# Patient Record
Sex: FEMALE | Race: OTHER | Employment: UNEMPLOYED | URBAN - METROPOLITAN AREA
[De-identification: names, ages, dates, MRNs, and addresses within clinical notes are randomized per-mention and may not be internally consistent; named-entity substitution may affect disease eponyms.]

---

## 2024-06-25 NOTE — TELEPHONE ENCOUNTER
Interpretor ID- [id number]    LMP- 4/4/24  First Pregnancy  No Hx miscarriage or ectopic pregnancy   Menstrual Cycle Regular    Patient has not had an ultrasound or any other prenatal care.     Scheduled for nurse education visit.

## 2024-06-28 NOTE — PROGRESS NOTES
Pt called today for RN OB Education.   LMP: 2024      Pre  BMI: 23.61    EPDS score: 0/30    Working MELYSSA:2025  Hx of genetic abnormality in family: Partner brother with Down Syndrome  Hx of varicella: Vaccinated   Flu Vaccine: Vaccinated   Covid Vaccine: Vaccinated      Consent (if needed): n/a     Sterilization/Contraception: Interested    OUD Screening: Pt. Has answered NO 5P questions and has NO  risk factors.    Pt. Given What pregnant women need to know handout.       SDOH Screening: Low risk     Educational material reviewed with patient: Prenatal care, nutrition, weight gain recommendations, travel, exercise, intercourse, pregnancy changes, safe medications, pregnancy and work, fetal movement, labor and  labor, warning signs, food safety, tdap, cord blood, breastfeeding- Breastfeeding, circumcision, and Group B strep.     Blood transfusion if needed: Consents     PN labs: Placed     ASA Therapy (2 tablets,inform pt to start at 12 wks not before): Discussed  Iron Supplementation (325 mg every other day): Rx sent  Vitamin D (2,000 IUs daily): Rx sent       Optional genetic screening labs were reviewed: Cell FreeDNA, FTS with US, Quad screen MSAFP and CF screening.   Interested- Tosha w/ gender placed up front.   [employer] Media Policy: Discussed       (Before scheduling, ask the following: location preference and provider language preference)    NOB apt:   JF      Disclaimer: The calendars that will be provided at your appointment, are a practice guideline and can change based on the individual.

## 2024-07-05 PROBLEM — O09.519 SUPERVISION OF HIGH-RISK PREGNANCY OF ELDERLY PRIMIGRAVIDA (HCC): Status: ACTIVE | Noted: 2024-07-05

## 2024-07-05 NOTE — PROGRESS NOTES
Shirley Redmond is a 34 year old female  Patient's last menstrual period was 2024.   Chief Complaint   Patient presents with    Prenatal Care     C/o reflux.  OBSTETRICS HISTORY:     OB History    Para Term  AB Living   1             SAB IAB Ectopic Multiple Live Births                  # Outcome Date GA Lbr Pierce/2nd Weight Sex Type Anes PTL Lv   1 Current                GYNE HISTORY:         Menarche: 11 (2024  9:06 AM)  Period Cycle (Days): 28-30 (2024  9:06 AM)  Period Duration (Days): 5 (2024  9:06 AM)  Period Flow: Light (2024  9:06 AM)  Use of Birth Control (if yes, specify type): OCP (2024  9:06 AM)  Date When Birth Control Last Used: 2023 (2024  9:06 AM)  Hx Prior Abnormal Pap: No (2024  9:06 AM)  Pap Date:  () (2024  9:06 AM)         No data to display                  MEDICAL HISTORY:     History reviewed. No pertinent past medical history.    SURGICAL HISTORY:     History reviewed. No pertinent surgical history.    SOCIAL HISTORY:     Social History     Socioeconomic History    Marital status: Unknown   Tobacco Use    Smoking status: Never    Smokeless tobacco: Never   Substance and Sexual Activity    Drug use: Never     Social Determinants of Health     Financial Resource Strain: Low Risk  (2024)    Financial Resource Strain     Difficulty of Paying Living Expenses: Not hard at all     Med Affordability: No   Food Insecurity: No Food Insecurity (2024)    Food Insecurity     Food Insecurity: Never true   Transportation Needs: No Transportation Needs (2024)    Transportation Needs     Lack of Transportation: No   Stress: No Stress Concern Present (2024)    Stress     Feeling of Stress : No   Housing Stability: Low Risk  (2024)    Housing Stability     Housing Instability: No        FAMILY HISTORY:     Family History   Problem Relation Age of Onset    Other (lupus) Mother     Other (hypothyroidism) Mother     No  Known Problems Maternal Grandmother     No Known Problems Maternal Grandfather     No Known Problems Paternal Grandmother     No Known Problems Paternal Grandfather        MEDICATIONS:       Current Outpatient Medications:     Aspirin 81 MG Oral Cap, Take 2 tablets by mouth daily., Disp: 90 capsule, Rfl: 2    Calcium 500 MG Oral Tab, Take 1,000 mg by mouth daily., Disp: 90 tablet, Rfl: 2    famotidine 20 MG Oral Tab, Take 2 tablets (40 mg total) by mouth at bedtime., Disp: 60 tablet, Rfl: 5    prenatal vitamin with DHA 27-0.8-228 MG Oral Cap, Take 1 capsule by mouth daily., Disp: , Rfl:     Ferrous Sulfate 325 (65 Fe) MG Oral Tab, Take 1 tablet (325 mg total) by mouth every other day., Disp: 90 tablet, Rfl: 1    Cholecalciferol (VITAMIN D) 50 MCG (2000 UT) Oral Tab, Take 1 tablet by mouth daily., Disp: 90 tablet, Rfl: 1    ALLERGIES:     No Known Allergies      REVIEW OF SYSTEMS:     Constitutional:    denies fever / chills  Eyes:     denies blurred or double vision  Cardiovascular:  denies chest pain or palpitations  Respiratory:    denies shortness of breath  Gastrointestinal:  denies severe abdominal pain, frequent diarrhea or constipation, nausea / vomiting  Genitourinary:    denies dysuria, bothersome incontinence  Skin/Breast:   denies any breast pain, lumps, or discharge  Neurological:    denies frequent severe headaches  Psychiatric:   denies depression or anxiety, thoughts of harming self or others  Heme/Lymph:    denies easy bruising or bleeding      PHYSICAL EXAM:   Blood pressure 118/74, weight 152 lb 3.2 oz (69 kg), last menstrual period 04/04/2024.  Constitutional:  well developed, well nourished  Head/Face:  normocephalic  Neck/Thyroid: thyroid symmetric, no thyromegaly, no nodules, no adenopathy  Lymphatic: no abnormal supraclavicular or axillary adenopathy is noted  Respiratory:      chest wall symmetric and nontender on palpation, clear to asculation bilateral, no wheezing, rales, ronchi, and  resonance normal upon percussion  Cardiovascular: chest normal in appearance, regular rate and rhythm, no murmurs, PMI palpated midclavicular line  Breast:   normal bilaterally without palpable masses, tenderness, asymmetry, nipple discharge, nipple retraction or skin changes bilaterally  Abdomen:   soft, nontender, nondistended, no masses  Skin/Hair:  no unusual rashes or bruises  Extremities:  no edema, no cyanosis, non tender bilaterally  Psychiatric:   oriented to time, place, person and situation. Appropriate mood and affect    Pelvic Exam:  External Genitalia:  normal appearance, hair distribution, and no lesions  Urethral Meatus:   normal in size, location, without lesions   Bladder:    no fullness, masses or tenderness  Vagina:    normal appearance without lesions, no abnormal discharge  Cervix:    No lesions, normal friability   Uterus:    normal in size, 8 wk sized, normal contour, position, mobility, without  motion tenderness  Adnexa:   normal without masses or tenderness  Perineum:   normal  Anus: no hemorroids     Bs ultrasound live iup    ASSESSMENT & PLAN:     [place] was seen today for prenatal care.    Diagnoses and all orders for this visit:  Added asa 81 mg 2 tabs daily for ama, primip  Ultrasound dating  Labs w/ free dna, sma, cg  caLcium 1g daily    Supervision of high-risk pregnancy of elderly primigravida (HCC)  -     Aspirin 81 MG Oral Cap; Take 2 tablets by mouth daily.  -     Calcium 500 MG Oral Tab; Take 1,000 mg by mouth daily.  -     famotidine 20 MG Oral Tab; Take 2 tablets (40 mg total) by mouth at bedtime.  -     US PREG 1ST TRIM W/EV (CPT=76801/47385); Future  -     ThinPrep Pap with HPV Reflex, Chlamydia/GC; Future  -     Chlamydia/Gc Amplification; Future  -     Cystic Fibrosis (CF), 97 Variants; Future  -     Spinal Muscular Atrophy (SMA); Future  -     [id number] PLUS+SCA; Future    History and physical exam have been performed.  If you ever need to reach a provider please call  the office phone number.  After office hours there is always somebody on call.  Reasons to call the provider discussed with patient.  Prenatal vitamins have been prescribed. The prenatal vitamin has iron and folic acid which helps to prevent iron deficiency anemia and neural tube defects.  Additional iron has been prescribed and should be taken every other day.  Vitamin D supplementation 4300-4434 Iunits daily can be given for pregnant patients whose children are deemed at high risk of asthma. (Patient or her  has asthma).  Vitamin B6 can be prescribed if there is nausea or vomiting and is safe to use up to 3 times daily.  Antacids for reflux are safe.  Tylenol Cold daytime or Tylenol flu daytime are safe to use if there are upper respiratory symptoms.  Extra strength Tylenol can be used for persistent headaches and back pain but in a limited fashion.  Avoidance of nonsteroidals discussed with the patient.  A healthy diet is important and dietary counseling done with the patient..  I counseled that fruits, vegetables, legumes, fish, lean meats, chicken, turkey, nuts and seeds are advised.  Fish to avoid are [place] Mackerel, [place], Orange roughy, Shark, Swordfish, Tilefish, Bigeye tuna. Canned light tuna (including skipjack) is a good choice.  Please avoid fried and greasy foods.  Please avoid caffeine, alcohol, THC.  I recommend calcium supplementation 500 mg daily and Vitamin D 1,000 mg daily that the patient can obtain over the counter.  Exercise is encouraged and is okay for 30 minutes daily 5 to 7 days/week.  Moderate intensity exercise (able to carry on a normal conversation during exercise) is advised.  Strength training is allowed in a safe manner as to not cause back injury.  Sexual intercourse is allowed if there is no vaginal bleeding . Hot tubs and saunas should be avoided during the first trimester.  Swimming is okay to participate.  The patient should be up-to-date regarding COVID-19 vaccination  and the influenza vaccine is recommended during influenza season.   Pregnancy risk factors were reviewed with the patient and prenatal visit frequency and potential timing of future ultrasounds and fetal monitoring if needed were discussed with the patient.  I reviewed the above with the patient and spent approx 32 minutes face to face consultation.         FOLLOW-UP     Return in about 4 weeks (around 8/2/2024) for prenatal visit.      Naresh Crawford MD  7/5/2024

## 2024-08-02 ENCOUNTER — HOSPITAL ENCOUNTER (OUTPATIENT)
Dept: ULTRASOUND IMAGING | Facility: HOSPITAL | Age: 34
Discharge: HOME | End: 2024-08-02
Attending: OBSTETRICS & GYNECOLOGY
Payer: MEDICAID

## 2024-08-02 DIAGNOSIS — O09.519 SUPERVISION OF HIGH-RISK PREGNANCY OF ELDERLY PRIMIGRAVIDA (HCC): ICD-10-CM

## 2024-08-02 PROCEDURE — 76815 OB US LIMITED FETUS(S): CPT | Performed by: OBSTETRICS & GYNECOLOGY

## 2024-08-02 RX ORDER — CHOLECALCIFEROL (VITAMIN D3) 25 MCG
1 TABLET,CHEWABLE ORAL DAILY
Qty: 30 CAPSULE | Refills: 0 | Status: SHIPPED | OUTPATIENT
Start: 2024-08-02

## 2024-08-16 RX ORDER — FERROUS SULFATE 325(65) MG
325 TABLET ORAL EVERY OTHER DAY
Qty: 90 TABLET | Refills: 1 | OUTPATIENT
Start: 2024-08-16

## 2024-08-26 ENCOUNTER — ROUTINE PRENATAL (OUTPATIENT)
Dept: OBGYN CLINIC | Facility: CLINIC | Age: 34
End: 2024-08-26
Payer: MEDICAID

## 2024-08-26 ENCOUNTER — LAB ENCOUNTER (OUTPATIENT)
Dept: LAB | Facility: HOSPITAL | Age: 34
End: 2024-08-26
Attending: OBSTETRICS & GYNECOLOGY
Payer: MEDICAID

## 2024-08-26 VITALS
SYSTOLIC BLOOD PRESSURE: 108 MMHG | DIASTOLIC BLOOD PRESSURE: 68 MMHG | WEIGHT: 159 LBS | BODY MASS INDEX: 24 KG/M2 | HEART RATE: 73 BPM

## 2024-08-26 DIAGNOSIS — O09.519 SUPERVISION OF HIGH-RISK PREGNANCY OF ELDERLY PRIMIGRAVIDA (HCC): ICD-10-CM

## 2024-08-26 DIAGNOSIS — O09.519 SUPERVISION OF HIGH-RISK PREGNANCY OF ELDERLY PRIMIGRAVIDA (HCC): Primary | ICD-10-CM

## 2024-08-26 PROCEDURE — 36415 COLL VENOUS BLD VENIPUNCTURE: CPT

## 2024-08-26 PROCEDURE — 82105 ALPHA-FETOPROTEIN SERUM: CPT

## 2024-08-26 PROCEDURE — 87086 URINE CULTURE/COLONY COUNT: CPT

## 2024-08-26 NOTE — PROGRESS NOTES
Shirley Blancas is a 34 year old female  Patient's last menstrual period was 2024 (exact date).   Chief Complaint   Patient presents with    Prenatal Care       OBSTETRICS HISTORY:     OB History    Para Term  AB Living   1             SAB IAB Ectopic Multiple Live Births                  # Outcome Date GA Lbr Pierce/2nd Weight Sex Type Anes PTL Lv   1 Current                GYNE HISTORY:         Menarche: 12 (2024  9:06 AM)  Period Cycle (Days): 28-30 (2024  9:06 AM)  Period Duration (Days): 5 (2024  9:06 AM)  Period Flow: Light (2024  9:06 AM)  Use of Birth Control (if yes, specify type): OCP (2024  9:06 AM)  Date When Birth Control Last Used: 2023 (2024  9:06 AM)  Hx Prior Abnormal Pap: No (2024  9:06 AM)  Pap Date:  () (2024  9:06 AM)        Latest Ref Rng & Units 2024    10:39 AM   RECENT PAP RESULTS   Thinprep Pap Negative for intraepithelial lesion or malignancy Negative for intraepithelial lesion or malignancy    HPV Negative Negative          MEDICAL HISTORY:     History reviewed. No pertinent past medical history.    SURGICAL HISTORY:     History reviewed. No pertinent surgical history.    SOCIAL HISTORY:     Social History     Socioeconomic History    Marital status: Single   Tobacco Use    Smoking status: Never    Smokeless tobacco: Never   Substance and Sexual Activity    Drug use: Never     Social Determinants of Health     Financial Resource Strain: Low Risk  (2024)    Financial Resource Strain     Difficulty of Paying Living Expenses: Not hard at all     Med Affordability: No   Food Insecurity: No Food Insecurity (2024)    Food Insecurity     Food Insecurity: Never true   Transportation Needs: No Transportation Needs (2024)    Transportation Needs     Lack of Transportation: No   Stress: No Stress Concern Present (2024)    Stress     Feeling of Stress : No   Housing Stability: Low Risk   (6/28/2024)    Housing Stability     Housing Instability: No        FAMILY HISTORY:     Family History   Problem Relation Age of Onset    Other (lupus) Mother     Other (hypothyroidism) Mother     No Known Problems Maternal Grandmother     No Known Problems Maternal Grandfather     No Known Problems Paternal Grandmother     No Known Problems Paternal Grandfather        MEDICATIONS:       Current Outpatient Medications:     prenatal vitamin with DHA 27-0.8-228 MG Oral Cap, Take 1 capsule by mouth daily., Disp: 30 capsule, Rfl: 0    Aspirin 81 MG Oral Cap, Take 2 tablets by mouth daily., Disp: 90 capsule, Rfl: 2    Calcium 500 MG Oral Tab, Take 1,000 mg by mouth daily., Disp: 90 tablet, Rfl: 2    famotidine 20 MG Oral Tab, Take 2 tablets (40 mg total) by mouth at bedtime., Disp: 60 tablet, Rfl: 5    Ferrous Sulfate 325 (65 Fe) MG Oral Tab, Take 1 tablet (325 mg total) by mouth every other day., Disp: 90 tablet, Rfl: 1    Cholecalciferol (VITAMIN D) 50 MCG (2000 UT) Oral Tab, Take 1 tablet by mouth daily., Disp: 90 tablet, Rfl: 1    ALLERGIES:     No Known Allergies      REVIEW OF SYSTEMS:     Constitutional:    denies fever / chills  Cardiovascular:  denies chest pain or palpitations  Respiratory:    denies shortness of breath  Gastrointestinal:  denies severe abdominal pain, frequent diarrhea or constipation, nausea / vomiting  Genitourinary:    denies dysuria, bothersome incontinence  Skin/Breast:   denies any breast pain, lumps, or discharge  Neurological:    denies frequent severe headaches  Psychiatric:   denies depression or anxiety, thoughts of harming self or others      PHYSICAL EXAM:   Blood pressure 108/68, pulse 73, weight 159 lb (72.1 kg), last menstrual period 04/04/2024.  Constitutional:  well developed, well nourished, no distress  Abdomen:   soft, gravid, nontender  Musculoskeletal: no cva tenderness bilaterally  Skin/Hair:  no unusual rashes or bruises  Extremities:  no edema, no cyanosis, non tender  bilaterally  Psychiatric:   oriented to time, place, person and situation. Appropriate mood and affect      ASSESSMENT & PLAN:     [place] was seen today for prenatal care.    Diagnoses and all orders for this visit:    Supervision of high-risk pregnancy of elderly primigravida (HCC)  -     Urine Culture, Routine; Future  -     AFP, Maternal Serum; Future  -     MATERNAL FETAL MEDICINE - INTERNAL      Prenatal checkless second trimester counseling has been completed.  I spent 32 minutes face-to-face with the patient, over half of the time in discussion and counseling of the below factors:  -Abnormal lab values.  I discussed all the current lab values that have returned.  I spent time discussing both normal and any abnormal lab values.  -Selecting a  care provider.  The patient was asked if she has a specific pediatrician that she would like for me to assign.  There is an [place] group that can be assigned if the patient does not have a specific designated provider.  -Prenatal classes.  I discussed with the patient that she can sign up for prenatal classes at [email or web address].  -Signs and symptoms of  labor.  We discussed symptoms such as vaginal bleeding and leakage of fluid vaginally.  These are symptoms that should be brought to the attention of one of the providers.  We discussed round ligament pain and how to help differentiate from  labor pain.  We discussed symptoms and treatment of lower back pain and how to prevent back injury in pregnancy.      FOLLOW-UP     No follow-ups on file.      Naresh Crawford MD  2024

## 2024-08-28 LAB
AFP MOM: 1.37
AFP VALUE: 79.2 NG/ML
GA ON COLL DATE: 20.6 WEEKS
INSULIN DEP AFP: NO
MAT AGE AT EDD: 35.9 YR
MULTIPLE GEST AFP: NO
OSBR RISK 1 IN AFP: 3920
WEIGHT AFP: 159 LBS

## 2024-09-03 RX ORDER — PRENATAL VIT/IRON FUM/FOLIC AC 27MG-0.8MG
1 TABLET ORAL DAILY
Qty: 30 TABLET | Refills: 0 | Status: SHIPPED | OUTPATIENT
Start: 2024-09-03

## 2024-09-09 ENCOUNTER — TELEPHONE (OUTPATIENT)
Dept: PERINATAL CARE | Facility: HOSPITAL | Age: 34
End: 2024-09-09

## 2024-09-09 NOTE — TELEPHONE ENCOUNTER
Using language line  ID [id number] patient's coverage was verified as [employer].  Patient informed [facility] is not contracted with them.  Patient verified that [employer] coverage will begin 10/1/2024.  Patient informed to contact OB office for alternative testing or to check if Level 2 can wait until October.  Patient agreed.

## 2024-09-24 ENCOUNTER — ROUTINE PRENATAL (OUTPATIENT)
Dept: OBGYN CLINIC | Facility: CLINIC | Age: 34
End: 2024-09-24

## 2024-09-24 VITALS
SYSTOLIC BLOOD PRESSURE: 113 MMHG | BODY MASS INDEX: 25 KG/M2 | HEART RATE: 72 BPM | WEIGHT: 162 LBS | DIASTOLIC BLOOD PRESSURE: 75 MMHG

## 2024-09-24 DIAGNOSIS — O09.519 SUPERVISION OF HIGH-RISK PREGNANCY OF ELDERLY PRIMIGRAVIDA (HCC): Primary | ICD-10-CM

## 2024-09-24 PROCEDURE — 99212 OFFICE O/P EST SF 10 MIN: CPT | Performed by: OBSTETRICS & GYNECOLOGY

## 2024-09-24 NOTE — PROGRESS NOTES
Virginia Merry Ruy Blancas is a 34 year old female  Patient's last menstrual period was 2024 (exact date).   Chief Complaint   Patient presents with    Prenatal Care     Pt here for prenatal visit       OBSTETRICS HISTORY:     OB History    Para Term  AB Living   1             SAB IAB Ectopic Multiple Live Births                  # Outcome Date GA Lbr Pierce/2nd Weight Sex Type Anes PTL Lv   1 Current                GYNE HISTORY:         Menarche: 12 (2024  9:06 AM)  Period Cycle (Days): 28-30 (2024  9:06 AM)  Period Duration (Days): 5 (2024  9:06 AM)  Period Flow: Light (2024  9:06 AM)  Use of Birth Control (if yes, specify type): OCP (2024  9:06 AM)  Date When Birth Control Last Used: 2023 (2024  9:06 AM)  Hx Prior Abnormal Pap: No (2024  9:06 AM)  Pap Date:  () (2024  9:06 AM)        Latest Ref Rng & Units 2024    10:39 AM   RECENT PAP RESULTS   Thinprep Pap Negative for intraepithelial lesion or malignancy Negative for intraepithelial lesion or malignancy    HPV Negative Negative          MEDICAL HISTORY:     History reviewed. No pertinent past medical history.    SURGICAL HISTORY:     History reviewed. No pertinent surgical history.    SOCIAL HISTORY:     Social History     Socioeconomic History    Marital status: Single   Tobacco Use    Smoking status: Never    Smokeless tobacco: Never   Substance and Sexual Activity    Drug use: Never     Social Determinants of Health     Financial Resource Strain: Low Risk  (2024)    Financial Resource Strain     Difficulty of Paying Living Expenses: Not hard at all     Med Affordability: No   Food Insecurity: No Food Insecurity (2024)    Food Insecurity     Food Insecurity: Never true   Transportation Needs: No Transportation Needs (2024)    Transportation Needs     Lack of Transportation: No   Stress: No Stress Concern Present (2024)    Stress     Feeling of Stress : No    Housing Stability: Low Risk  (6/28/2024)    Housing Stability     Housing Instability: No        FAMILY HISTORY:     Family History   Problem Relation Age of Onset    Other (lupus) Mother     Other (hypothyroidism) Mother     No Known Problems Maternal Grandmother     No Known Problems Maternal Grandfather     No Known Problems Paternal Grandmother     No Known Problems Paternal Grandfather        MEDICATIONS:       Current Outpatient Medications:     PRENATAL 27-0.8 MG Oral Tab, TAKE 1 TABLET BY MOUTH ONCE DAILY, Disp: 30 tablet, Rfl: 0    prenatal vitamin with DHA 27-0.8-228 MG Oral Cap, Take 1 capsule by mouth daily., Disp: 30 capsule, Rfl: 0    Aspirin 81 MG Oral Cap, Take 2 tablets by mouth daily., Disp: 90 capsule, Rfl: 2    famotidine 20 MG Oral Tab, Take 2 tablets (40 mg total) by mouth at bedtime., Disp: 60 tablet, Rfl: 5    Ferrous Sulfate 325 (65 Fe) MG Oral Tab, Take 1 tablet (325 mg total) by mouth every other day., Disp: 90 tablet, Rfl: 1    Cholecalciferol (VITAMIN D) 50 MCG (2000 UT) Oral Tab, Take 1 tablet by mouth daily., Disp: 90 tablet, Rfl: 1    ALLERGIES:     No Known Allergies      REVIEW OF SYSTEMS:     Constitutional:    denies fever / chills  Cardiovascular:  denies chest pain or palpitations  Respiratory:    denies shortness of breath  Gastrointestinal:  denies severe abdominal pain, frequent diarrhea or constipation, nausea / vomiting  Genitourinary:    denies dysuria, bothersome incontinence  Skin/Breast:   denies any breast pain, lumps, or discharge  Neurological:    denies frequent severe headaches  Psychiatric:   denies depression or anxiety, thoughts of harming self or others      PHYSICAL EXAM:   Blood pressure 113/75, pulse 72, weight 162 lb (73.5 kg), last menstrual period 04/04/2024.  Constitutional:  well developed, well nourished, no distress  Abdomen:   soft, gravid, nontender  Musculoskeletal: no cva tenderness bilaterally  Skin/Hair:  no unusual rashes or  bruises  Extremities:  no edema, no cyanosis, non tender bilaterally  Psychiatric:   oriented to time, place, person and situation. Appropriate mood and affect      ASSESSMENT & PLAN:     [place]ia was seen today for prenatal care.    Diagnoses and all orders for this visit:    Supervision of high-risk pregnancy of elderly primigravida (HCC)  -     MATERNAL FETAL MEDICINE - INTERNAL      Level 2 ordered    FOLLOW-UP     No follow-ups on file.      Naresh Crawford MD  9/24/2024

## 2024-10-14 RX ORDER — FERROUS SULFATE 325(65) MG
325 TABLET ORAL EVERY OTHER DAY
Qty: 90 TABLET | Refills: 1 | Status: SHIPPED | OUTPATIENT
Start: 2024-10-14

## 2024-10-17 ENCOUNTER — HOSPITAL ENCOUNTER (OUTPATIENT)
Dept: PERINATAL CARE | Facility: HOSPITAL | Age: 34
Discharge: HOME | End: 2024-10-17
Attending: OBSTETRICS & GYNECOLOGY
Payer: MEDICAID

## 2024-10-17 VITALS — HEART RATE: 76 BPM | SYSTOLIC BLOOD PRESSURE: 115 MMHG | DIASTOLIC BLOOD PRESSURE: 73 MMHG

## 2024-10-17 DIAGNOSIS — O09.519 SUPERVISION OF HIGH-RISK PREGNANCY OF ELDERLY PRIMIGRAVIDA (HCC): ICD-10-CM

## 2024-10-17 DIAGNOSIS — O09.519 SUPERVISION OF HIGH-RISK PREGNANCY OF ELDERLY PRIMIGRAVIDA (HCC): Primary | ICD-10-CM

## 2024-10-17 DIAGNOSIS — Z36.89 ENCOUNTER FOR FETAL ANATOMIC SURVEY (HCC): ICD-10-CM

## 2024-10-17 PROCEDURE — 76811 OB US DETAILED SNGL FETUS: CPT | Performed by: OBSTETRICS & GYNECOLOGY

## 2024-10-17 NOTE — PROGRESS NOTES
Outpatient Maternal-Fetal Medicine Consultation    Dear Dr. Crawford,    Thank you for requesting ultrasound evaluation and maternal fetal medicine consultation on your patient Shirley Blancas.  As you are aware she is a 34 year old female with a Romano pregnancy at 28w0d.  A maternal-fetal medicine consultation was requested secondary to advanced maternal age.  Her prenatal records and labs were reviewed.    Her maternity 21 screen was low probability for the common aneuploidies.  She had negative carrier screening for SMA and cystic fibrosis.  Her maternal serum AFP was low probability for an open neural tube defect    She is feeling fetal movements regularly.  She has no contractions or concerns.    HISTORY  OB History    Para Term  AB Living   1 0 0 0 0 0   SAB IAB Ectopic Multiple Live Births   0 0 0 0 0     # 1 - Date: None, Sex: None, Weight: None, GA: None, Type: None, Apgar1: None, Apgar5: None, Living: None, Birth Comments: None    Past Medical History  The patient  has no past medical history on file.    Past Surgical History  The patient  has no past surgical history on file.    Family History  The patient She indicated that her mother is alive. She indicated that her father is . She indicated that the status of her maternal grandmother is unknown. She indicated that the status of her maternal grandfather is unknown. She indicated that the status of her paternal grandmother is unknown. She indicated that the status of her paternal grandfather is unknown.      Medications:   Current Outpatient Medications:     Ferrous Sulfate 325 (65 Fe) MG Oral Tab, Take 1 tablet (325 mg total) by mouth every other day., Disp: 90 tablet, Rfl: 1    PRENATAL 27-0.8 MG Oral Tab, TAKE 1 TABLET BY MOUTH ONCE DAILY, Disp: 30 tablet, Rfl: 0    prenatal vitamin with DHA 27-0.8-228 MG Oral Cap, Take 1 capsule by mouth daily., Disp: 30 capsule, Rfl: 0    Aspirin 81 MG Oral Cap, Take 2  tablets by mouth daily., Disp: 90 capsule, Rfl: 2    famotidine 20 MG Oral Tab, Take 2 tablets (40 mg total) by mouth at bedtime., Disp: 60 tablet, Rfl: 5    Cholecalciferol (VITAMIN D) 50 MCG (2000 UT) Oral Tab, Take 1 tablet by mouth daily., Disp: 90 tablet, Rfl: 1  Allergies: Allergies[1]      PHYSICAL EXAMINATION:  /73   Pulse 76   LMP 04/04/2024 (Exact Date)   General: alert and oriented,no acute distress  Abdomen: gravid, soft, non-tender  Extremities: non-tender, no edema      OBSTETRIC ULTRASOUND  The patient had a level 2 ultrasound today which I interpreted the results and reviewed them with the patient.    Ultrasound Findings:  Single IUP in breech presentation.    Placenta is posterior, left.   A 3 vessel cord is noted.  Cardiac activity is present at 157 bpm  EFW 1321 g ( 2 lb 15 oz);   MVP is 6 cm .     The fetal measurements are consistent with the established EDC. No ultrasound evidence of structural abnormalities are seen today. The nasal bone is present. No ultrasound evidence of markers for aneuploidy are seen. She understands that ultrasound exam cannot exclude genetic abnormalities and that genetic testing is recommended. The limitations of ultrasound were discussed.     Uterus and adnexa appeared normal today on US    See imaging tab for the complete US report.    DISCUSSION  During her visit we discussed and reviewed the following issues:  ADVANCED MATERNAL AGE    Background  I reviewed with the patient that pregnancies in women of advanced maternal age (35 or older at delivery) are associated with elevated risks. Specifically, there is a higher rate of:  Fetal malformations  Preeclampsia  Gestational diabetes  Intrauterine fetal death    As a result, enhanced pregnancy surveillance is advised for these patients including a comprehensive ultrasound to assess for fetal malformations (at 20 weeks) and a third trimester ultrasound assessment for fetal growth (at 32 weeks). In addition,  weekly NST's (initiating at 36 weeks gestation for women 35-39 years and at 32 weeks gestation for women 40 years and older) are also advised. Routine obstetric care is more than adequate to assess for gestational diabetes and preeclampsia; hence, no further significant alterations in obstetric care are advised.    Medical Complications    Women 35 years of age or older can expect to experience two to three fold higher rates of hospitalization,  delivery, and pregnancy-related complications when compared to their younger counterparts.  The two most common medical problems complicating these  pregnanccies are hypertension and diabetes.   The incidence of preeclampsia in the general obstetric population is 3 to 4 percent; this increases to 5 to 10 percent in women over age 40 and is as high as 35 percent in women over age 50.   The incidence of gestational diabetes in the general obstetric population is 3 percent, rising to 7 to 12 percent in women over age 40 and 20 percent in women over age 50.  Women 35 years of age or older are more likely to be delivered by . The  delivery rate in the general obstetric population of the [place] is almost 30 percent, compared to almost 50 percent in women over age 40 to 45 and almost 80 percent in women age 50 to 63.          Fetal Death        A decision analysis tool using data from the [place] [employer]  [employer] predicted a strategy of weekly antepartum testing and labor induction would lower the risk of unexplained fetal death in women 35 years of age or older. In this model, weekly testing starting at 36 weeks of gestation would drop the risk of fetal death from 5.2 to 1.3 per 1000 pregnancies. While a policy of antepartum testing in older women does increase the chance that a women will be induced (71 inductions per fetal death averted) and thereby increases her risk of having a  delivery, only 14 additional cesareans would  need to be performed to avert one unexplained fetal death.  Hence, weekly NST's are advised for women of advanced maternal age; testing should be initiated at 36 weeks for women 35-39 years and at 32 weeks for women 40 years and older.    Fetal Malformations    Cardiac malformations, clubfoot, and diaphragmatic hernia appear to occur with increased frequency in offspring of older women. These abnormalities are structural and unrelated to aneuploidy, thus they would not be detected by karyotype analysis.  For these reasons a complete, detailed ultrasound (level II) is advised even if the fetus has a normal karyotype.      Fetal Aneuploidy  We also discussed the increased risk of chromosomal abnormalities associated with advanced maternal age. I reviewed that an ultrasound examination cannot reliably exclude potential genetic abnormalities. Given that the patient will be 34 years old at the time of delivery I reviewed that her risk (at amniocentesis) of having a fetus with any chromosome abnormality is 1:140 and with trisomy 21 is 1: 250.    Invasive Testing  I offered invasive genetic testing (amniocentesis, chorionic villus sampling) after reviewing the diagnostic accuracy of these tests as well as the procedure associated loss rate (1:500 for genetic amniocentesis).    She ultimately does not desire invasive genetic testing.     Non-invasive Pregnancy Testing (NIPT) - -reviewed her low risk cell free DNA screen and the limitations of ultrasound.      We reviewed the signs and symptoms of preeclampsia,  labor and monitoring fetal movement.  We discussed reasons for her to call her physician.    We discussed the recommended plan of care based on her  risk factors.  Virginia had her questions answered to her satisfaction.    History, interpretation of the ultrasound and recommendations for plan of care were performed with the assistance of [employer] line solution  ID number  [id number].      IMPRESSION:  IUP at 28w0d  Normal level 2 ultrasound; fetal measurements consistent with dates  Primigravida advanced maternal age, low risk cell free DNA screening    RECOMMENDATIONS:  Continue care with Dr. Crawford  Weekly NSTs starting at 36 weeks  Follow-up growth and BPP in 6 weeks    Total time spent 40 minutes this calendar day which includes preparing to see the patient including chart review, obtaining and/or reviewing additional medical history, performing a physical exam and evaluation, documenting clinical information in the electronic medical record, independently interpreting results, counseling the patient, communicating results to the patient/family/caregiver and coordinating care.     Case discussed with patient who demonstrated understanding and agreement with plan.     Thank you for allowing me to participate in the care of this patient.  Please feel free to contact me with any questions.    Bisi Wheeler MD  Maternal-Fetal Medicine       Note to patient and family:  The 21st Century Cures Act makes medical notes available to patients in the interest of transparency.  However, please be advised that this is a medical document.  It is intended as a peer to peer communication.  It is written in medical language and may contain abbreviations or verbiage that are technical and unfamiliar.  It may appear blunt or direct.  Medical documents are intended to carry relevant information, facts as evident, and the clinical opinion of the practitioner.         [1] No Known Allergies

## 2024-10-22 ENCOUNTER — LAB ENCOUNTER (OUTPATIENT)
Dept: LAB | Facility: HOSPITAL | Age: 34
End: 2024-10-22
Attending: OBSTETRICS & GYNECOLOGY
Payer: MEDICAID

## 2024-10-22 ENCOUNTER — ROUTINE PRENATAL (OUTPATIENT)
Dept: OBGYN CLINIC | Facility: CLINIC | Age: 34
End: 2024-10-22
Payer: MEDICAID

## 2024-10-22 VITALS
DIASTOLIC BLOOD PRESSURE: 68 MMHG | SYSTOLIC BLOOD PRESSURE: 109 MMHG | BODY MASS INDEX: 26 KG/M2 | HEART RATE: 70 BPM | WEIGHT: 169 LBS

## 2024-10-22 DIAGNOSIS — O09.519 SUPERVISION OF HIGH-RISK PREGNANCY OF ELDERLY PRIMIGRAVIDA (HCC): Primary | ICD-10-CM

## 2024-10-22 DIAGNOSIS — O09.519 SUPERVISION OF HIGH-RISK PREGNANCY OF ELDERLY PRIMIGRAVIDA (HCC): ICD-10-CM

## 2024-10-22 LAB
DEPRECATED HBV CORE AB SER IA-ACNC: 33 NG/ML
DEPRECATED RDW RBC AUTO: 43.9 FL (ref 35.1–46.3)
ERYTHROCYTE [DISTWIDTH] IN BLOOD BY AUTOMATED COUNT: 12.8 % (ref 11–15)
GLUCOSE 1H P GLC SERPL-MCNC: 87 MG/DL
HCT VFR BLD AUTO: 34.8 %
HGB BLD-MCNC: 12 G/DL
MCH RBC QN AUTO: 32.3 PG (ref 26–34)
MCHC RBC AUTO-ENTMCNC: 34.5 G/DL (ref 31–37)
MCV RBC AUTO: 93.5 FL
PLATELET # BLD AUTO: 186 10(3)UL (ref 150–450)
RBC # BLD AUTO: 3.72 X10(6)UL
T PALLIDUM AB SER QL IA: NONREACTIVE
WBC # BLD AUTO: 8.5 X10(3) UL (ref 4–11)

## 2024-10-22 PROCEDURE — 86780 TREPONEMA PALLIDUM: CPT

## 2024-10-22 PROCEDURE — 99214 OFFICE O/P EST MOD 30 MIN: CPT | Performed by: OBSTETRICS & GYNECOLOGY

## 2024-10-22 PROCEDURE — 90472 IMMUNIZATION ADMIN EACH ADD: CPT | Performed by: OBSTETRICS & GYNECOLOGY

## 2024-10-22 PROCEDURE — 82728 ASSAY OF FERRITIN: CPT

## 2024-10-22 PROCEDURE — 85027 COMPLETE CBC AUTOMATED: CPT

## 2024-10-22 PROCEDURE — 90471 IMMUNIZATION ADMIN: CPT | Performed by: OBSTETRICS & GYNECOLOGY

## 2024-10-22 PROCEDURE — 87389 HIV-1 AG W/HIV-1&-2 AB AG IA: CPT

## 2024-10-22 PROCEDURE — 82950 GLUCOSE TEST: CPT

## 2024-10-22 PROCEDURE — 90715 TDAP VACCINE 7 YRS/> IM: CPT | Performed by: OBSTETRICS & GYNECOLOGY

## 2024-10-22 PROCEDURE — 36415 COLL VENOUS BLD VENIPUNCTURE: CPT

## 2024-10-22 PROCEDURE — 90656 IIV3 VACC NO PRSV 0.5 ML IM: CPT | Performed by: OBSTETRICS & GYNECOLOGY

## 2024-10-22 RX ORDER — TRIAMCINOLONE ACETONIDE 1 MG/G
1 OINTMENT TOPICAL NIGHTLY
Qty: 15 G | Refills: 0 | Status: SHIPPED | OUTPATIENT
Start: 2024-10-22

## 2024-10-22 NOTE — PROGRESS NOTES
Virginia Merry Ruy Blancas is a 34 year old female  Patient's last menstrual period was 2024 (exact date).   Chief Complaint   Patient presents with    Prenatal Care     Pt has rash posterior right leg  OBSTETRICS HISTORY:     OB History    Para Term  AB Living   1             SAB IAB Ectopic Multiple Live Births                  # Outcome Date GA Lbr Pierce/2nd Weight Sex Type Anes PTL Lv   1 Current                GYNE HISTORY:         Menarche: 12 (2024  9:06 AM)  Period Cycle (Days): 28-30 (2024  9:06 AM)  Period Duration (Days): 5 (2024  9:06 AM)  Period Flow: Light (2024  9:06 AM)  Use of Birth Control (if yes, specify type): OCP (2024  9:06 AM)  Date When Birth Control Last Used: 2023 (2024  9:06 AM)  Hx Prior Abnormal Pap: No (2024  9:06 AM)  Pap Date:  () (2024  9:06 AM)        Latest Ref Rng & Units 2024    10:39 AM   RECENT PAP RESULTS   INTERPRETATION/RESULT: Negative for intraepithelial lesion or malignancy Negative for intraepithelial lesion or malignancy    HPV Negative Negative          MEDICAL HISTORY:     History reviewed. No pertinent past medical history.    SURGICAL HISTORY:     History reviewed. No pertinent surgical history.    SOCIAL HISTORY:     Social History     Socioeconomic History    Marital status: Single   Tobacco Use    Smoking status: Never    Smokeless tobacco: Never   Substance and Sexual Activity    Drug use: Never     Social Drivers of Health     Financial Resource Strain: Low Risk  (2024)    Financial Resource Strain     Difficulty of Paying Living Expenses: Not hard at all     Med Affordability: No   Food Insecurity: No Food Insecurity (2024)    Food Insecurity     Food Insecurity: Never true   Transportation Needs: No Transportation Needs (2024)    Transportation Needs     Lack of Transportation: No   Stress: No Stress Concern Present (2024)    Stress     Feeling of Stress :  No   Housing Stability: Low Risk  (6/28/2024)    Housing Stability     Housing Instability: No        FAMILY HISTORY:     Family History   Problem Relation Age of Onset    Other (lupus) Mother     Other (hypothyroidism) Mother     No Known Problems Maternal Grandmother     No Known Problems Maternal Grandfather     No Known Problems Paternal Grandmother     No Known Problems Paternal Grandfather        MEDICATIONS:       Current Outpatient Medications:     Ferrous Sulfate 325 (65 Fe) MG Oral Tab, Take 1 tablet (325 mg total) by mouth every other day., Disp: 90 tablet, Rfl: 1    PRENATAL 27-0.8 MG Oral Tab, TAKE 1 TABLET BY MOUTH ONCE DAILY, Disp: 30 tablet, Rfl: 0    prenatal vitamin with DHA 27-0.8-228 MG Oral Cap, Take 1 capsule by mouth daily., Disp: 30 capsule, Rfl: 0    Aspirin 81 MG Oral Cap, Take 2 tablets by mouth daily., Disp: 90 capsule, Rfl: 2    famotidine 20 MG Oral Tab, Take 2 tablets (40 mg total) by mouth at bedtime., Disp: 60 tablet, Rfl: 5    Cholecalciferol (VITAMIN D) 50 MCG (2000 UT) Oral Tab, Take 1 tablet by mouth daily., Disp: 90 tablet, Rfl: 1    ALLERGIES:     Allergies[1]      REVIEW OF SYSTEMS:     Constitutional:    denies fever / chills  Cardiovascular:  denies chest pain or palpitations  Respiratory:    denies shortness of breath  Gastrointestinal:  denies severe abdominal pain, frequent diarrhea or constipation, nausea / vomiting  Genitourinary:    denies dysuria, bothersome incontinence  Skin/Breast:   denies any breast pain, lumps, or discharge  Neurological:    denies frequent severe headaches  Psychiatric:   denies depression or anxiety, thoughts of harming self or others      PHYSICAL EXAM:   Blood pressure 109/68, pulse 70, weight 169 lb (76.7 kg), last menstrual period 04/04/2024.  Constitutional:  well developed, well nourished, no distress  Abdomen:   soft, gravid, nontender  Musculoskeletal: no cva tenderness bilaterally  Skin/Hair:  no unusual rashes or  bruises  Extremities:  no edema, no cyanosis, non tender bilaterally  Psychiatric:   oriented to time, place, person and situation. Appropriate mood and affect      ASSESSMENT & PLAN:     Virginia was seen today for prenatal care.    Diagnoses and all orders for this visit:    Supervision of high-risk pregnancy of elderly primigravida (HCC)  -     TETANUS, DIPHTHERIA TOXOIDS AND ACELLULAR PERTUSIS VACCINE (TDAP), >7 YEARS, IM USE  -     Fluzone trivalent vaccine, PF 0.5mL, 6mo+ (71121)  -     Glucose Tolerance, 50 gm (1 hr), Gestational (ADA); Future  -     HIV Ag/Ab Combo; Future  -     T Pallidum Screening [place]; Future  -     CBC, Platelet; No Differential; Future  -     Ferritin; Future      Kenalog for rash  Prenatal checklist third trimester counseling has been completed. I spent 33 minutes face-to-face with the patient, over half of the time in discussion and counseling of the below factors:  -Anesthesia/analgesia plans.  IV pain relief drugs d/w pt and that substantial relief of labor pain is generally not achieved with this method, and that these drugs can cross the placenta which can decreased fetal heart rate variability in utero and increased  respiratory depression and neurobehavioral changes. Up to two-thirds of women report moderate or severe pain one or two hours after administration. Epidural may be initiated at any stage of labor and can be maintained by rebolus. Efficacy dw pt and hypotension prevention with fluid bolus d/w pt  -Circumcision. D/w pt that we can most likely perform in the hospital before discharge. The rate of complications during and in the first month is approximately 0.2% including bleeding, infection, urethral complications. The decision is optional and benefits from male circumcision include prevention of : urinary tract infections, acquisition of HIV, some STD's, and penile cancer.   -Breast of bottle feeding.  Breastfeeding is strongly recommended because of direct  benefits to the infant's nutrition, GI function, host defense including against otitis media and pneumonia during the first few years of life, and psychological well-being. Moderate-quality evidence exists for prevention of type 1 DM, inflammatory bowel disease, and wheezing in young children.  - education ( screening, jaundice, SIDS prevention, car seat, vit K, erythromycin, routine care) also d/w pt.  -Family medical leave or disability forms. Our office will be happy to assist in addressing these forms.  -GBBS. This bacterial normally colonizes the GI and genital tracts of 15 to 40% of pregnant women. Colonization is asymptomatic, but if left untreated in the event of a positve rectovaginal culture (done at 36 weeks plus) can cause infection in neonates and infants less than 90 days of age. If culture positive, the administration of antibiotics in labor is associated with a 69% reduction in early onset GBBS disease compared with no prevention strategy at all. GBBS in the urine or history of infant affected by early onset GBS disease are examples of automatic treatment prophylaxis during labor and the 36 week plus culture is not needed.   -Tdap vaccine.  Ideally give b/w 27 and 36 wks EGA in each pregnancy to help provide protection against pertussis.   -Postpartum family planning/contraception/tubal sterilization.  D/w pt and all questions answered  -Birth plan.  Birth plans will be reviewed with the patient if they elect to complete one. This is not a requirement. Support persons appear to have both psychological and medical benefits.   -Labor instruction info sheet.  Labor signs and symptoms discussed with patient. Entry into hospital when labor ensues d/w pt. Some of the signs might be a gush or trickle of amniotic fluid, vaginal bleeding, regular contractions.   -Emergency blood transfusion. If deemed clinically necessary and with the patient's consent, she does accept blood transfusion. Risks  and benefits of blood transfusion d/w pt:  -Fetal movement monitoring.  Patient instructed to either contact provider of come straight to labor and delivery without contacting the provider if she perceives a significant and persistant reduction in fetal movement and never to wait longer than two hours if there is absent fetal movement. If the patient is in doubt, she can come to hospital if she counts fewer than 10 kicks over two consecutive hours when lying on her side.  -Labor signs.  If the patient is experiencing uterine tightening (usually lasting 30-60 seconds) every 10 minutes or more she may contact the provider or come to labor and delivery without calling the provider. Patient instructed to also come to labor and delivery if vaginal bleeding or leakage of fluid vaginally.   -Signs and symptoms of pregnancy induced hypertension.  New onset of hypertension after 20 weeks gestation with or wihout protein in the urine are evaluated for preeclamsia. Symptoms include persistent headache, especially if not relieved by tylenol. Other visual symptoms include scotomata, photophobia, blurred vision, or temporary blindness. Upper abdominal or epigastric pain and altered mental status, or confusion are other symptoms.  -Postpartum depression. Patient asked to inform the provider is suspected. The disorder is suspected in women who manifest anxiety about the health of the infant, concern about their ability to care for the infant, despondency, lack of interest in the infant's activities, and substance abuse disorders.       FOLLOW-UP     No follow-ups on file.      Naresh Crawford MD  10/22/2024         [1] No Known Allergies

## 2024-11-04 RX ORDER — PRENATAL VIT/IRON FUM/FOLIC AC 27MG-0.8MG
1 TABLET ORAL DAILY
Qty: 30 TABLET | Refills: 0 | Status: SHIPPED | OUTPATIENT
Start: 2024-11-04

## 2024-11-05 ENCOUNTER — ROUTINE PRENATAL (OUTPATIENT)
Dept: OBGYN CLINIC | Facility: CLINIC | Age: 34
End: 2024-11-05
Payer: MEDICAID

## 2024-11-05 ENCOUNTER — LAB ENCOUNTER (OUTPATIENT)
Dept: LAB | Facility: HOSPITAL | Age: 34
End: 2024-11-05
Attending: OBSTETRICS & GYNECOLOGY
Payer: MEDICAID

## 2024-11-05 VITALS
DIASTOLIC BLOOD PRESSURE: 71 MMHG | WEIGHT: 170 LBS | HEART RATE: 71 BPM | BODY MASS INDEX: 26 KG/M2 | SYSTOLIC BLOOD PRESSURE: 110 MMHG

## 2024-11-05 DIAGNOSIS — O09.519 SUPERVISION OF HIGH-RISK PREGNANCY OF ELDERLY PRIMIGRAVIDA (HCC): Primary | ICD-10-CM

## 2024-11-05 DIAGNOSIS — R30.0 DYSURIA: ICD-10-CM

## 2024-11-05 PROCEDURE — 87186 SC STD MICRODIL/AGAR DIL: CPT

## 2024-11-05 PROCEDURE — 87077 CULTURE AEROBIC IDENTIFY: CPT

## 2024-11-05 PROCEDURE — 87086 URINE CULTURE/COLONY COUNT: CPT

## 2024-11-05 PROCEDURE — 99212 OFFICE O/P EST SF 10 MIN: CPT | Performed by: OBSTETRICS & GYNECOLOGY

## 2024-11-05 NOTE — PROGRESS NOTES
Virginia Merry Ruy Blancas is a 34 year old female  Patient's last menstrual period was 2024 (exact date).   Chief Complaint   Patient presents with    Prenatal Care     Pt here for prenatal visit,        OBSTETRICS HISTORY:     OB History    Para Term  AB Living   1             SAB IAB Ectopic Multiple Live Births                  # Outcome Date GA Lbr Pierce/2nd Weight Sex Type Anes PTL Lv   1 Current                GYNE HISTORY:         Menarche: 12 (2024  9:06 AM)  Period Cycle (Days): 28-30 (2024  9:06 AM)  Period Duration (Days): 5 (2024  9:06 AM)  Period Flow: Light (2024  9:06 AM)  Use of Birth Control (if yes, specify type): OCP (2024  9:06 AM)  Date When Birth Control Last Used: 2023 (2024  9:06 AM)  Hx Prior Abnormal Pap: No (2024  9:06 AM)  Pap Date:  () (2024  9:06 AM)        Latest Ref Rng & Units 2024    10:39 AM   RECENT PAP RESULTS   INTERPRETATION/RESULT: Negative for intraepithelial lesion or malignancy Negative for intraepithelial lesion or malignancy    HPV Negative Negative          MEDICAL HISTORY:     History reviewed. No pertinent past medical history.    SURGICAL HISTORY:     History reviewed. No pertinent surgical history.    SOCIAL HISTORY:     Social History     Socioeconomic History    Marital status: Single   Tobacco Use    Smoking status: Never    Smokeless tobacco: Never   Substance and Sexual Activity    Drug use: Never     Social Drivers of Health     Financial Resource Strain: Low Risk  (2024)    Financial Resource Strain     Difficulty of Paying Living Expenses: Not hard at all     Med Affordability: No   Food Insecurity: No Food Insecurity (2024)    Food Insecurity     Food Insecurity: Never true   Transportation Needs: No Transportation Needs (2024)    Transportation Needs     Lack of Transportation: No   Stress: No Stress Concern Present (2024)    Stress     Feeling of Stress :  No   Housing Stability: Low Risk  (6/28/2024)    Housing Stability     Housing Instability: No        FAMILY HISTORY:     Family History   Problem Relation Age of Onset    Other (lupus) Mother     Other (hypothyroidism) Mother     No Known Problems Maternal Grandmother     No Known Problems Maternal Grandfather     No Known Problems Paternal Grandmother     No Known Problems Paternal Grandfather        MEDICATIONS:       Current Outpatient Medications:     PRENATAL 27-0.8 MG Oral Tab, TAKE 1 TABLET BY MOUTH DAILY, Disp: 30 tablet, Rfl: 0    triamcinolone 0.1 % External Ointment, Apply 1 Application topically nightly., Disp: 15 g, Rfl: 0    Ferrous Sulfate 325 (65 Fe) MG Oral Tab, Take 1 tablet (325 mg total) by mouth every other day., Disp: 90 tablet, Rfl: 1    prenatal vitamin with DHA 27-0.8-228 MG Oral Cap, Take 1 capsule by mouth daily., Disp: 30 capsule, Rfl: 0    Aspirin 81 MG Oral Cap, Take 2 tablets by mouth daily., Disp: 90 capsule, Rfl: 2    famotidine 20 MG Oral Tab, Take 2 tablets (40 mg total) by mouth at bedtime., Disp: 60 tablet, Rfl: 5    Cholecalciferol (VITAMIN D) 50 MCG (2000 UT) Oral Tab, Take 1 tablet by mouth daily., Disp: 90 tablet, Rfl: 1    ALLERGIES:     Allergies[1]      REVIEW OF SYSTEMS:     Constitutional:    denies fever / chills  Cardiovascular:  denies chest pain or palpitations  Respiratory:    denies shortness of breath  Gastrointestinal:  denies severe abdominal pain, frequent diarrhea or constipation, nausea / vomiting  Genitourinary:    denies dysuria, bothersome incontinence  Skin/Breast:   denies any breast pain, lumps, or discharge  Neurological:    denies frequent severe headaches  Psychiatric:   denies depression or anxiety, thoughts of harming self or others      PHYSICAL EXAM:   Blood pressure 110/71, pulse 71, weight 170 lb (77.1 kg), last menstrual period 04/04/2024.  Constitutional:  well developed, well nourished, no distress  Abdomen:   soft, gravid,  nontender  Musculoskeletal: no cva tenderness bilaterally  Skin/Hair:  no unusual rashes or bruises  Extremities:  no edema, no cyanosis, non tender bilaterally  Psychiatric:   oriented to time, place, person and situation. Appropriate mood and affect      ASSESSMENT & PLAN:     Virginia was seen today for prenatal care.    Diagnoses and all orders for this visit:    Supervision of high-risk pregnancy of elderly primigravida (HCC)    Dysuria  -     Urine Culture, Routine; Future    Fetal kick counts discussed with  patient, labor precautions given.        FOLLOW-UP     No follow-ups on file.      Naresh Crawford MD  11/5/2024         [1] No Known Allergies

## 2024-11-07 DIAGNOSIS — N30.00 ACUTE CYSTITIS WITHOUT HEMATURIA: Primary | ICD-10-CM

## 2024-11-07 RX ORDER — SULFAMETHOXAZOLE AND TRIMETHOPRIM 800; 160 MG/1; MG/1
1 TABLET ORAL 2 TIMES DAILY
Qty: 14 TABLET | Refills: 0 | Status: SHIPPED | OUTPATIENT
Start: 2024-11-07 | End: 2024-11-14

## 2024-11-19 ENCOUNTER — LAB ENCOUNTER (OUTPATIENT)
Dept: LAB | Facility: HOSPITAL | Age: 34
End: 2024-11-19
Attending: OBSTETRICS & GYNECOLOGY
Payer: MEDICAID

## 2024-11-19 ENCOUNTER — ROUTINE PRENATAL (OUTPATIENT)
Dept: OBGYN CLINIC | Facility: CLINIC | Age: 34
End: 2024-11-19
Payer: MEDICAID

## 2024-11-19 VITALS
HEART RATE: 73 BPM | BODY MASS INDEX: 26 KG/M2 | DIASTOLIC BLOOD PRESSURE: 73 MMHG | SYSTOLIC BLOOD PRESSURE: 114 MMHG | WEIGHT: 170 LBS

## 2024-11-19 DIAGNOSIS — O09.519 SUPERVISION OF HIGH-RISK PREGNANCY OF ELDERLY PRIMIGRAVIDA (HCC): Primary | ICD-10-CM

## 2024-11-19 DIAGNOSIS — O09.519 SUPERVISION OF HIGH-RISK PREGNANCY OF ELDERLY PRIMIGRAVIDA (HCC): ICD-10-CM

## 2024-11-19 DIAGNOSIS — Z34.83 ENCOUNTER FOR SUPERVISION OF OTHER NORMAL PREGNANCY IN THIRD TRIMESTER (HCC): ICD-10-CM

## 2024-11-19 PROCEDURE — 99212 OFFICE O/P EST SF 10 MIN: CPT | Performed by: OBSTETRICS & GYNECOLOGY

## 2024-11-19 PROCEDURE — 87086 URINE CULTURE/COLONY COUNT: CPT

## 2024-11-19 NOTE — PROGRESS NOTES
Virginia Merry Ruy Blancas is a 34 year old female  Patient's last menstrual period was 2024 (exact date).   Chief Complaint   Patient presents with    Prenatal Care     Pt here for prenatal visit        OBSTETRICS HISTORY:     OB History    Para Term  AB Living   1             SAB IAB Ectopic Multiple Live Births                  # Outcome Date GA Lbr Pierce/2nd Weight Sex Type Anes PTL Lv   1 Current                GYNE HISTORY:         Menarche: 12 (2024  9:06 AM)  Period Cycle (Days): 28-30 (2024  9:06 AM)  Period Duration (Days): 5 (2024  9:06 AM)  Period Flow: Light (2024  9:06 AM)  Use of Birth Control (if yes, specify type): OCP (2024  9:06 AM)  Date When Birth Control Last Used: 2023 (2024  9:06 AM)  Hx Prior Abnormal Pap: No (2024  9:06 AM)  Pap Date:  () (2024  9:06 AM)        Latest Ref Rng & Units 2024    10:39 AM   RECENT PAP RESULTS   INTERPRETATION/RESULT: Negative for intraepithelial lesion or malignancy Negative for intraepithelial lesion or malignancy    HPV Negative Negative          MEDICAL HISTORY:     History reviewed. No pertinent past medical history.    SURGICAL HISTORY:     History reviewed. No pertinent surgical history.    SOCIAL HISTORY:     Social History     Socioeconomic History    Marital status: Single   Tobacco Use    Smoking status: Never    Smokeless tobacco: Never   Substance and Sexual Activity    Drug use: Never     Social Drivers of Health     Financial Resource Strain: Low Risk  (2024)    Financial Resource Strain     Difficulty of Paying Living Expenses: Not hard at all     Med Affordability: No   Food Insecurity: No Food Insecurity (2024)    Food Insecurity     Food Insecurity: Never true   Transportation Needs: No Transportation Needs (2024)    Transportation Needs     Lack of Transportation: No   Stress: No Stress Concern Present (2024)    Stress     Feeling of Stress :  No   Housing Stability: Low Risk  (6/28/2024)    Housing Stability     Housing Instability: No        FAMILY HISTORY:     Family History   Problem Relation Age of Onset    Other (lupus) Mother     Other (hypothyroidism) Mother     No Known Problems Maternal Grandmother     No Known Problems Maternal Grandfather     No Known Problems Paternal Grandmother     No Known Problems Paternal Grandfather        MEDICATIONS:       Current Outpatient Medications:     PRENATAL 27-0.8 MG Oral Tab, TAKE 1 TABLET BY MOUTH DAILY, Disp: 30 tablet, Rfl: 0    Ferrous Sulfate 325 (65 Fe) MG Oral Tab, Take 1 tablet (325 mg total) by mouth every other day., Disp: 90 tablet, Rfl: 1    Aspirin 81 MG Oral Cap, Take 2 tablets by mouth daily., Disp: 90 capsule, Rfl: 2    Cholecalciferol (VITAMIN D) 50 MCG (2000 UT) Oral Tab, Take 1 tablet by mouth daily., Disp: 90 tablet, Rfl: 1    ALLERGIES:     Allergies[1]      REVIEW OF SYSTEMS:     Constitutional:    denies fever / chills  Cardiovascular:  denies chest pain or palpitations  Respiratory:    denies shortness of breath  Gastrointestinal:  denies severe abdominal pain, frequent diarrhea or constipation, nausea / vomiting  Genitourinary:    denies dysuria, bothersome incontinence  Skin/Breast:   denies any breast pain, lumps, or discharge  Neurological:    denies frequent severe headaches  Psychiatric:   denies depression or anxiety, thoughts of harming self or others      PHYSICAL EXAM:   Blood pressure 114/73, pulse 73, weight 170 lb (77.1 kg), last menstrual period 04/04/2024.  Constitutional:  well developed, well nourished, no distress  Abdomen:   soft, gravid, nontender  Musculoskeletal: no cva tenderness bilaterally  Skin/Hair:  no unusual rashes or bruises  Extremities:  no edema, no cyanosis, non tender bilaterally  Psychiatric:   oriented to time, place, person and situation. Appropriate mood and affect      ASSESSMENT & PLAN:     Virginia was seen today for prenatal  care.    Diagnoses and all orders for this visit:    Supervision of high-risk pregnancy of elderly primigravida ([facility])  -     Urine Culture, Routine; Future  -     US OB LIMITED FOR ONE OR MORE FETUS EMG ONLY; Future  -     RSV VACC PREF BIVALENT IM    Encounter for supervision of other normal pregnancy in third trimester ([facility])  -     RSV VACC PREF BIVALENT IM    Marylou for ama wnl today, marylou borderline high  Fetal kick counts discussed with  patient, labor precautions given.  Rsv today      FOLLOW-UP     No follow-ups on file.      Naresh Crawford MD  11/19/2024         [1] No Known Allergies

## 2024-11-25 NOTE — PROGRESS NOTES
Outpatient Maternal-Fetal Medicine Follow-Up    Dear Dr. Crawford    Thank you for requesting an ultrasound and maternal-fetal medicine consultation on your patient Shirley Blancas.  As you are aware she is a 34 year old female  with a brown pregnancy and an Estimated Date of Delivery: 25.  She returned to maternal-fetal medicine today for a follow-up visit.  Her history was reviewed from her prior visit and there were no interval changes.    Antepartum Risk Factors  AMA: low-risk cell free fetal DNA, declined invasive testing    PHYSICAL EXAMINATION:  /79   Pulse 81   Wt 171 lb (77.6 kg)   LMP 2024 (Exact Date)   BMI 26.22 kg/m²   General: alert and oriented, no acute distress  Abdomen: gravid, soft, non-tender  Extremities: non-tender, no edema    OBSTETRIC ULTRASOUND  The patient had a follow-up growth ultrasound today which revealed normal interval fetal growth, BPP 8/8.    Ultrasound Findings:  Single IUP in cephalic presentation.    Placenta is posterior, left.   A 3 vessel cord is noted.  Cardiac activity is present at 151 bpm  EFW 2820 g ( 6 lb 3 oz); 68%.    SILVIA is  17.9 cm.  MVP is 6.1 cm  BPP is 8/8.     The fetal measurements are consistent with established EDC. No gross ultrasound evidence of structural abnormalities are seen today. The patient understands that ultrasound cannot rule out all structural and chromosomal abnormalities.     See Imaging Tab For Complete Ultrasound Report  I interpreted the results and reviewed them with the patient.    DISCUSSION  During her visit we discussed and reviewed the following issues:  ADVANCED MATERNAL AGE  See prior M notes for a detailed review.  She did not desire invasive genetic testing.   She has already obtained a low-risk NPIT result and was appropriately reassured.    IMPRESSION:  IUP at 34w5d  AMA: low-risk cell free fetal DNA, declined invasive testing    RECOMMENDATIONS:  Continue care with   Yvette  Weekly NST's at 36 weeks    Thank you for allowing me to participate in the care of your patient.  Please do not hesitate to contact me if additional questions or concerns arise.      Collin Maciel M.D.    20 minutes spent in review of records, patient consultation, documentation and coordination of care.  The relevant clinical matter(s) are summarized above.     A Yi- was utilized during the appointment to communicate my assessment and recommendations ( #[id number]).      Note to patient and family  The 21st Century Cures Act makes medical notes available to patients in the interest of transparency.  However, please be advised that this is a medical document.  It is intended as [id number] communication.  It is written and medical language may contain abbreviations or verbiage that are technical and unfamiliar.  It may appear blunt or direct.  Medical documents are intended to carry relevant information, facts as evident, and the clinical opinion of the practitioner.

## 2024-11-27 ENCOUNTER — ROUTINE PRENATAL (OUTPATIENT)
Dept: OBGYN CLINIC | Facility: CLINIC | Age: 34
End: 2024-11-27
Payer: MEDICAID

## 2024-11-27 VITALS
WEIGHT: 171 LBS | SYSTOLIC BLOOD PRESSURE: 107 MMHG | DIASTOLIC BLOOD PRESSURE: 72 MMHG | HEART RATE: 68 BPM | BODY MASS INDEX: 26 KG/M2

## 2024-11-27 DIAGNOSIS — O09.519 SUPERVISION OF HIGH-RISK PREGNANCY OF ELDERLY PRIMIGRAVIDA (HCC): Primary | ICD-10-CM

## 2024-11-27 PROCEDURE — 99212 OFFICE O/P EST SF 10 MIN: CPT | Performed by: OBSTETRICS & GYNECOLOGY

## 2024-11-27 RX ORDER — FAMOTIDINE 20 MG/1
40 TABLET, FILM COATED ORAL NIGHTLY
Qty: 60 TABLET | Refills: 5 | Status: SHIPPED | OUTPATIENT
Start: 2024-11-27

## 2024-11-27 NOTE — PROGRESS NOTES
Virginia Merry Ruy Blancas is a 34 year old female  Patient's last menstrual period was 2024 (exact date).   Chief Complaint   Patient presents with    Prenatal Care     Pt here for prenatal visit       OBSTETRICS HISTORY:     OB History    Para Term  AB Living   1             SAB IAB Ectopic Multiple Live Births                  # Outcome Date GA Lbr Pierce/2nd Weight Sex Type Anes PTL Lv   1 Current                GYNE HISTORY:         Menarche: 12 (2024  9:06 AM)  Period Cycle (Days): 28-30 (2024  9:06 AM)  Period Duration (Days): 5 (2024  9:06 AM)  Period Flow: Light (2024  9:06 AM)  Use of Birth Control (if yes, specify type): OCP (2024  9:06 AM)  Date When Birth Control Last Used: 2023 (2024  9:06 AM)  Hx Prior Abnormal Pap: No (2024  9:06 AM)  Pap Date:  () (2024  9:06 AM)        Latest Ref Rng & Units 2024    10:39 AM   RECENT PAP RESULTS   INTERPRETATION/RESULT: Negative for intraepithelial lesion or malignancy Negative for intraepithelial lesion or malignancy    HPV Negative Negative          MEDICAL HISTORY:     History reviewed. No pertinent past medical history.    SURGICAL HISTORY:     History reviewed. No pertinent surgical history.    SOCIAL HISTORY:     Social History     Socioeconomic History    Marital status: Single   Tobacco Use    Smoking status: Never    Smokeless tobacco: Never   Substance and Sexual Activity    Drug use: Never     Social Drivers of Health     Financial Resource Strain: Low Risk  (2024)    Financial Resource Strain     Difficulty of Paying Living Expenses: Not hard at all     Med Affordability: No   Food Insecurity: No Food Insecurity (2024)    Food Insecurity     Food Insecurity: Never true   Transportation Needs: No Transportation Needs (2024)    Transportation Needs     Lack of Transportation: No   Stress: No Stress Concern Present (2024)    Stress     Feeling of Stress :  No   Housing Stability: Low Risk  (6/28/2024)    Housing Stability     Housing Instability: No        FAMILY HISTORY:     Family History   Problem Relation Age of Onset    Other (lupus) Mother     Other (hypothyroidism) Mother     No Known Problems Maternal Grandmother     No Known Problems Maternal Grandfather     No Known Problems Paternal Grandmother     No Known Problems Paternal Grandfather        MEDICATIONS:       Current Outpatient Medications:     famotidine 20 MG Oral Tab, Take 2 tablets (40 mg total) by mouth at bedtime., Disp: 60 tablet, Rfl: 5    PRENATAL 27-0.8 MG Oral Tab, TAKE 1 TABLET BY MOUTH DAILY, Disp: 30 tablet, Rfl: 0    Ferrous Sulfate 325 (65 Fe) MG Oral Tab, Take 1 tablet (325 mg total) by mouth every other day., Disp: 90 tablet, Rfl: 1    Aspirin 81 MG Oral Cap, Take 2 tablets by mouth daily., Disp: 90 capsule, Rfl: 2    Cholecalciferol (VITAMIN D) 50 MCG (2000 UT) Oral Tab, Take 1 tablet by mouth daily., Disp: 90 tablet, Rfl: 1    ALLERGIES:     Allergies[1]      REVIEW OF SYSTEMS:     Constitutional:    denies fever / chills  Cardiovascular:  denies chest pain or palpitations  Respiratory:    denies shortness of breath  Gastrointestinal:  denies severe abdominal pain, frequent diarrhea or constipation, nausea / vomiting  Genitourinary:    denies dysuria, bothersome incontinence  Skin/Breast:   denies any breast pain, lumps, or discharge  Neurological:    denies frequent severe headaches  Psychiatric:   denies depression or anxiety, thoughts of harming self or others      PHYSICAL EXAM:   Blood pressure 107/72, pulse 68, weight 171 lb (77.6 kg), last menstrual period 04/04/2024.  Constitutional:  well developed, well nourished, no distress  Abdomen:   soft, gravid, nontender  Musculoskeletal: no cva tenderness bilaterally  Skin/Hair:  no unusual rashes or bruises  Extremities:  no edema, no cyanosis, non tender bilaterally  Psychiatric:   oriented to time, place, person and situation.  Appropriate mood and affect      ASSESSMENT & PLAN:     Virginia was seen today for prenatal care.    Diagnoses and all orders for this visit:    Supervision of high-risk pregnancy of elderly primigravida (HCC)  -     famotidine 20 MG Oral Tab; Take 2 tablets (40 mg total) by mouth at bedtime.    Fetal kick counts discussed with  patient, labor precautions given.        FOLLOW-UP     No follow-ups on file.      Naresh Crawford MD  11/27/2024         [1] No Known Allergies

## 2024-12-03 ENCOUNTER — HOSPITAL ENCOUNTER (OUTPATIENT)
Dept: PERINATAL CARE | Facility: HOSPITAL | Age: 34
Discharge: HOME | End: 2024-12-03
Attending: OBSTETRICS & GYNECOLOGY
Payer: MEDICAID

## 2024-12-03 VITALS
WEIGHT: 171 LBS | SYSTOLIC BLOOD PRESSURE: 124 MMHG | BODY MASS INDEX: 26 KG/M2 | DIASTOLIC BLOOD PRESSURE: 79 MMHG | HEART RATE: 81 BPM

## 2024-12-03 DIAGNOSIS — O09.519 SUPERVISION OF HIGH-RISK PREGNANCY OF ELDERLY PRIMIGRAVIDA (HCC): Primary | ICD-10-CM

## 2024-12-03 DIAGNOSIS — O09.519 SUPERVISION OF HIGH-RISK PREGNANCY OF ELDERLY PRIMIGRAVIDA (HCC): ICD-10-CM

## 2024-12-03 PROCEDURE — 76816 OB US FOLLOW-UP PER FETUS: CPT | Performed by: OBSTETRICS & GYNECOLOGY

## 2024-12-03 PROCEDURE — 76819 FETAL BIOPHYS PROFIL W/O NST: CPT

## 2024-12-04 ENCOUNTER — ROUTINE PRENATAL (OUTPATIENT)
Dept: OBGYN CLINIC | Facility: CLINIC | Age: 34
End: 2024-12-04

## 2024-12-04 VITALS
SYSTOLIC BLOOD PRESSURE: 117 MMHG | WEIGHT: 172 LBS | HEART RATE: 77 BPM | BODY MASS INDEX: 26 KG/M2 | DIASTOLIC BLOOD PRESSURE: 72 MMHG

## 2024-12-04 DIAGNOSIS — O09.519 SUPERVISION OF HIGH-RISK PREGNANCY OF ELDERLY PRIMIGRAVIDA (HCC): Primary | ICD-10-CM

## 2024-12-04 PROCEDURE — 99212 OFFICE O/P EST SF 10 MIN: CPT | Performed by: OBSTETRICS & GYNECOLOGY

## 2024-12-04 NOTE — PROGRESS NOTES
Virginia Merry Ruy Blancas is a 34 year old female  Patient's last menstrual period was 2024 (exact date).   Chief Complaint   Patient presents with    Prenatal Care     Pt presents for repeat OB visit        OBSTETRICS HISTORY:     OB History    Para Term  AB Living   1             SAB IAB Ectopic Multiple Live Births                  # Outcome Date GA Lbr Pierce/2nd Weight Sex Type Anes PTL Lv   1 Current                GYNE HISTORY:         Menarche: 12 (2024  9:06 AM)  Period Cycle (Days): 28-30 (2024  9:06 AM)  Period Duration (Days): 5 (2024  9:06 AM)  Period Flow: Light (2024  9:06 AM)  Use of Birth Control (if yes, specify type): OCP (2024  9:06 AM)  Date When Birth Control Last Used: 2023 (2024  9:06 AM)  Hx Prior Abnormal Pap: No (2024  9:06 AM)  Pap Date:  () (2024  9:06 AM)        Latest Ref Rng & Units 2024    10:39 AM   RECENT PAP RESULTS   INTERPRETATION/RESULT: Negative for intraepithelial lesion or malignancy Negative for intraepithelial lesion or malignancy    HPV Negative Negative          MEDICAL HISTORY:     History reviewed. No pertinent past medical history.    SURGICAL HISTORY:     History reviewed. No pertinent surgical history.    SOCIAL HISTORY:     Social History     Socioeconomic History    Marital status: Single   Tobacco Use    Smoking status: Never    Smokeless tobacco: Never   Substance and Sexual Activity    Drug use: Never     Social Drivers of Health     Financial Resource Strain: Low Risk  (2024)    Financial Resource Strain     Difficulty of Paying Living Expenses: Not hard at all     Med Affordability: No   Food Insecurity: No Food Insecurity (2024)    Food Insecurity     Food Insecurity: Never true   Transportation Needs: No Transportation Needs (2024)    Transportation Needs     Lack of Transportation: No   Stress: No Stress Concern Present (2024)    Stress     Feeling of  Stress : No   Housing Stability: Low Risk  (6/28/2024)    Housing Stability     Housing Instability: No        FAMILY HISTORY:     Family History   Problem Relation Age of Onset    Other (lupus) Mother     Other (hypothyroidism) Mother     No Known Problems Maternal Grandmother     No Known Problems Maternal Grandfather     No Known Problems Paternal Grandmother     No Known Problems Paternal Grandfather        MEDICATIONS:       Current Outpatient Medications:     famotidine 20 MG Oral Tab, Take 2 tablets (40 mg total) by mouth at bedtime., Disp: 60 tablet, Rfl: 5    PRENATAL 27-0.8 MG Oral Tab, TAKE 1 TABLET BY MOUTH DAILY, Disp: 30 tablet, Rfl: 0    Ferrous Sulfate 325 (65 Fe) MG Oral Tab, Take 1 tablet (325 mg total) by mouth every other day., Disp: 90 tablet, Rfl: 1    Aspirin 81 MG Oral Cap, Take 2 tablets by mouth daily., Disp: 90 capsule, Rfl: 2    Cholecalciferol (VITAMIN D) 50 MCG (2000 UT) Oral Tab, Take 1 tablet by mouth daily., Disp: 90 tablet, Rfl: 1    ALLERGIES:     Allergies[1]      REVIEW OF SYSTEMS:     Constitutional:    denies fever / chills  Cardiovascular:  denies chest pain or palpitations  Respiratory:    denies shortness of breath  Gastrointestinal:  denies severe abdominal pain, frequent diarrhea or constipation, nausea / vomiting  Genitourinary:    denies dysuria, bothersome incontinence  Skin/Breast:   denies any breast pain, lumps, or discharge  Neurological:    denies frequent severe headaches  Psychiatric:   denies depression or anxiety, thoughts of harming self or others      PHYSICAL EXAM:   Blood pressure 117/72, pulse 77, weight 172 lb (78 kg), last menstrual period 04/04/2024.  Constitutional:  well developed, well nourished, no distress  Abdomen:   soft, gravid, nontender  Musculoskeletal: no cva tenderness bilaterally  Skin/Hair:  no unusual rashes or bruises  Extremities:  no edema, no cyanosis, non tender bilaterally  Psychiatric:   oriented to time, place, person and  situation. Appropriate mood and affect      ASSESSMENT & PLAN:     Virginia was seen today for prenatal care.    Diagnoses and all orders for this visit:    Supervision of high-risk pregnancy of elderly primigravida (HCC)      Fetal kick counts discussed with  patient, labor precautions given.    Ultrasound yesterday normal  FOLLOW-UP     No follow-ups on file.      Naresh Crawford MD  12/4/2024         [1] No Known Allergies

## 2024-12-11 ENCOUNTER — ROUTINE PRENATAL (OUTPATIENT)
Dept: OBGYN CLINIC | Facility: CLINIC | Age: 34
End: 2024-12-11
Payer: MEDICAID

## 2024-12-11 VITALS
BODY MASS INDEX: 27 KG/M2 | WEIGHT: 174 LBS | DIASTOLIC BLOOD PRESSURE: 71 MMHG | HEART RATE: 85 BPM | SYSTOLIC BLOOD PRESSURE: 108 MMHG

## 2024-12-11 DIAGNOSIS — G44.209 ACUTE NON INTRACTABLE TENSION-TYPE HEADACHE: ICD-10-CM

## 2024-12-11 DIAGNOSIS — O09.519 SUPERVISION OF HIGH-RISK PREGNANCY OF ELDERLY PRIMIGRAVIDA (HCC): Primary | ICD-10-CM

## 2024-12-11 PROCEDURE — 99214 OFFICE O/P EST MOD 30 MIN: CPT | Performed by: STUDENT IN AN ORGANIZED HEALTH CARE EDUCATION/TRAINING PROGRAM

## 2024-12-11 RX ORDER — METOCLOPRAMIDE 10 MG/1
10 TABLET ORAL EVERY 6 HOURS PRN
Qty: 20 TABLET | Refills: 0 | Status: SHIPPED | OUTPATIENT
Start: 2024-12-11

## 2024-12-11 RX ORDER — DIPHENHYDRAMINE HCL 25 MG
25 CAPSULE ORAL EVERY 6 HOURS PRN
Qty: 16 CAPSULE | Refills: 0 | Status: SHIPPED | OUTPATIENT
Start: 2024-12-11

## 2024-12-11 RX ORDER — MAGNESIUM OXIDE 400 MG/1
400 TABLET ORAL NIGHTLY
Qty: 30 TABLET | Refills: 1 | Status: SHIPPED | OUTPATIENT
Start: 2024-12-11

## 2024-12-11 NOTE — PROGRESS NOTES
Chief Complaint   Patient presents with    Prenatal Care     : [place], [place] [id number]    Virginia Merry Blancas is a 34 year old female  Patient's last menstrual period was 2024 (exact date).   Chief Complaint   Patient presents with    Prenatal Care    here for return OB visit.    Reports normal fetal movement, no contractions, no vaginal bleeding, and no LOF. Denies headache, vision change, chest pain, SOB, RUQ pain, or changes in LE swelling.     Reports \"Mild\" HA (rated 5/10), and sometimes stomach is hard.   Had significant migraines prior to pregnancy but this feels different. No vision changes. Took tylenol w/o much help  Took Excedrin migraine before pregnancy    OBSTETRICS HISTORY:     OB History    Para Term  AB Living   1             SAB IAB Ectopic Multiple Live Births                  # Outcome Date GA Lbr Pierce/2nd Weight Sex Type Anes PTL Lv   1 Current                Lab Results   Component Value Date    ABO BLOOD TYPE O 2024    RH BLOOD TYPE Positive 2024    HGB 12.0 10/22/2024    .0 10/22/2024    HCT 34.8 (L) 10/22/2024    Rubella IgG Positive 2024    Treponemal Antibodies Nonreactive 10/22/2024    HIV Antigen Antibody Combo Non-Reactive 10/22/2024          MEDICATIONS:       Current Outpatient Medications:     diphenhydrAMINE 25 MG Oral Cap, Take 1 capsule (25 mg total) by mouth every 6 (six) hours as needed for Sleep (Headache)., Disp: 16 capsule, Rfl: 0    magnesium oxide 400 MG Oral Tab, Take 1 tablet (400 mg total) by mouth at bedtime., Disp: 30 tablet, Rfl: 1    metoclopramide (REGLAN) 10 MG Oral Tab, Take 1 tablet (10 mg total) by mouth every 6 (six) hours as needed., Disp: 20 tablet, Rfl: 0    famotidine 20 MG Oral Tab, Take 2 tablets (40 mg total) by mouth at bedtime., Disp: 60 tablet, Rfl: 5    PRENATAL 27-0.8 MG Oral Tab, TAKE 1 TABLET BY MOUTH DAILY, Disp: 30 tablet, Rfl: 0    Ferrous Sulfate 325 (65 Fe) MG Oral Tab,  Take 1 tablet (325 mg total) by mouth every other day., Disp: 90 tablet, Rfl: 1    Aspirin 81 MG Oral Cap, Take 2 tablets by mouth daily., Disp: 90 capsule, Rfl: 2    Cholecalciferol (VITAMIN D) 50 MCG (2000 UT) Oral Tab, Take 1 tablet by mouth daily., Disp: 90 tablet, Rfl: 1      PHYSICAL EXAM:     OB Prenatal Vitals  MATERNAL VITALS  Weight: 174 lb (78.9 kg)  BP: 108/71  Pulse: 85  FHT AND FUNDAL HEIGHT  FHT: 145  Fundal Height: 37             Constitutional:  well developed, well nourished, no distress  Abdomen:   soft, gravid, nontender  Musculoskeletal: no cva tenderness bilaterally  Skin/Hair:  no unusual rashes or bruises  Extremities:  no edema, no cyanosis, non tender bilaterally  Psychiatric:   oriented to time, place, person and situation. Appropriate mood and affect      ASSESSMENT & PLAN:     Supervision of high-risk pregnancy of elderly primigravida (HCC) (Primary)  Assessment & Plan:  GBS collected today.   Discussed delivery timing; prefers to wait for natural labor (no later than 40 wga) if possible, but reviewed that with recent growth US with AC in 97% and BPD 92% as well as AMA that may recommend IOL closer to 39+0. Will continue to monitor fundal height.   Plans to breastfeed; does not have pump but recommended obtaining one.   Plans to space out births 12-18 months, but undecided on contraception at this time.  Orders:  -     Group B Strep PCR; Future; Expected date: 12/11/2024  -     Group B Strep PCR  Acute non intractable tension-type headache  Assessment & Plan:  No vision change, with significant migraine history previously. Normotensive. Plan for PO HA cocktail at home with magnesium oxide, benadryl, Reglan + 1000 mg tylenol + 500-1000 mL of water. Can add Tums or Pepcid as well if has at home. If no improvement after 2 rounds or if change in vision, should call office to be seen.   Orders:  -     diphenhydrAMINE HCl; Take 1 capsule (25 mg total) by mouth every 6 (six) hours as needed for  Sleep (Headache).  Dispense: 16 capsule; Refill: 0  -     Magnesium Oxide; Take 1 tablet (400 mg total) by mouth at bedtime.  Dispense: 30 tablet; Refill: 1  -     Metoclopramide HCl; Take 1 tablet (10 mg total) by mouth every 6 (six) hours as needed.  Dispense: 20 tablet; Refill: 0        FOLLOW-UP     RETURN TO CLINIC  RTC: 2 wk    Gisell Cope MD  12/11/2024

## 2024-12-12 ENCOUNTER — TELEPHONE (OUTPATIENT)
Dept: OBGYN CLINIC | Facility: CLINIC | Age: 34
End: 2024-12-12

## 2024-12-12 PROBLEM — G44.209 ACUTE NON INTRACTABLE TENSION-TYPE HEADACHE: Status: ACTIVE | Noted: 2024-12-12

## 2024-12-12 LAB — GROUP B STREP BY PCR FOR PCR OVT: NEGATIVE

## 2024-12-12 NOTE — ASSESSMENT & PLAN NOTE
GBS collected today.   Discussed delivery timing; prefers to wait for natural labor (no later than 40 wga) if possible, but reviewed that with recent growth US with AC in 97% and BPD 92% as well as AMA that may recommend IOL closer to 39+0. Will continue to monitor fundal height.   Plans to breastfeed; does not have pump but recommended obtaining one.   Plans to space out births 12-18 months, but undecided on contraception at this time.

## 2024-12-12 NOTE — TELEPHONE ENCOUNTER
----- Message from Gisell Cope sent at 12/12/2024  9:00 AM CST -----  Regarding: HA Cocktail  Can someone call this patient and check in about her headache, and walk her through taking a headache cocktail at home? She told me about a headache yesterday but we were having a difficult time with the .     My HA cocktail \"recipe\" is 10 mg reglan + 25 mg Benadryl + 800 mg magnesium oxide + 1000 mg tylenol and between 500-1000 mL of water or Gatorade equivalent over a 1 hour period.     Thank you!    Gisell

## 2024-12-12 NOTE — ASSESSMENT & PLAN NOTE
No vision change, with significant migraine history previously. Normotensive. Plan for PO HA cocktail at home with magnesium oxide, benadryl, Reglan + 1000 mg tylenol + 500-1000 mL of water. Can add Tums or Pepcid as well if has at home. If no improvement after 2 rounds or if change in vision, should call office to be seen.

## 2024-12-13 NOTE — TELEPHONE ENCOUNTER
Pt name and  verified     Pt reports headache has improved and only gets headache at night. Pt informed of Dr. Cope's recommendations. Pt verbalized understanding and agreed. Pt aware to call if headache worsens or remains unresolved.

## 2024-12-19 ENCOUNTER — ROUTINE PRENATAL (OUTPATIENT)
Dept: OBGYN CLINIC | Facility: CLINIC | Age: 34
End: 2024-12-19
Payer: MEDICAID

## 2024-12-19 ENCOUNTER — APPOINTMENT (OUTPATIENT)
Dept: OBGYN CLINIC | Facility: HOSPITAL | Age: 34
End: 2024-12-19
Payer: MEDICAID

## 2024-12-19 ENCOUNTER — HOSPITAL ENCOUNTER (OUTPATIENT)
Facility: HOSPITAL | Age: 34
Discharge: HOME | End: 2024-12-19
Attending: OBSTETRICS & GYNECOLOGY | Admitting: OBSTETRICS & GYNECOLOGY
Payer: MEDICAID

## 2024-12-19 VITALS — BODY MASS INDEX: 28 KG/M2 | DIASTOLIC BLOOD PRESSURE: 82 MMHG | SYSTOLIC BLOOD PRESSURE: 126 MMHG | WEIGHT: 179.38 LBS

## 2024-12-19 VITALS
SYSTOLIC BLOOD PRESSURE: 120 MMHG | DIASTOLIC BLOOD PRESSURE: 82 MMHG | HEART RATE: 72 BPM | TEMPERATURE: 98 F | RESPIRATION RATE: 17 BRPM

## 2024-12-19 DIAGNOSIS — O09.519 SUPERVISION OF HIGH-RISK PREGNANCY OF ELDERLY PRIMIGRAVIDA (HCC): Primary | ICD-10-CM

## 2024-12-19 PROCEDURE — 76815 OB US LIMITED FETUS(S): CPT | Performed by: OBSTETRICS & GYNECOLOGY

## 2024-12-19 PROCEDURE — 59025 FETAL NON-STRESS TEST: CPT | Performed by: OBSTETRICS & GYNECOLOGY

## 2024-12-19 PROCEDURE — 99213 OFFICE O/P EST LOW 20 MIN: CPT | Performed by: OBSTETRICS & GYNECOLOGY

## 2024-12-19 PROCEDURE — 99212 OFFICE O/P EST SF 10 MIN: CPT | Performed by: OBSTETRICS & GYNECOLOGY

## 2024-12-19 NOTE — PROGRESS NOTES
[facility]  part of [facility]    History & Physical    Shirley Blancas Patient Status:  Outpatient    1990 MRN [id number]   Location [facility] Attending Baltazar Carreon MD   Hosp Day # 0 PCP No primary care provider on file.     Date of Admission:  2024      HPI:   Shirley Blancas is a 34 year old  female, current EGA of 37w0d with an estimated date of delivery of: Estimated Date of Delivery: 25      Shirley Blancas is being admitted for observation.    Her current obstetrical history is significant for advanced maternal age.    Patient reports no complaints and good fetal movement .     Fetal Movement: normal.     History   Obstetric History:   OB History    Para Term  AB Living   1             SAB IAB Ectopic Multiple Live Births                  # Outcome Date GA Lbr Pierce/2nd Weight Sex Type Anes PTL Lv   1 Current              Past Medical History: History reviewed. No pertinent past medical history.  Past Social History: History reviewed. No pertinent surgical history.  Family History:   Family History   Problem Relation Age of Onset    Other (lupus) Mother     Other (hypothyroidism) Mother     No Known Problems Maternal Grandmother     No Known Problems Maternal Grandfather     No Known Problems Paternal Grandmother     No Known Problems Paternal Grandfather      Social History:   Social History     Tobacco Use    Smoking status: Never    Smokeless tobacco: Never   Substance Use Topics    Alcohol use: Not on file        Allergies/Medications:   Allergies:   Allergies[1]  Medications:  Prescriptions Prior to Admission[2]    Review of Systems:   As documented in HPI    no complaints and good fetal movement    Physical Exam:   Temp:  [97.5 °F (36.4 °C)] 97.5 °F (36.4 °C)  Pulse:  [72] 72  Resp:  [17] 17  BP: (120)/(82) 120/82    Constitutional: alert, appears stated  age, and cooperative  Respiratory: clear to auscultation bilaterally  Cardiac: regular rate and rhythm, S1, S2 normal, no murmur, click, rub or gallop  Abdomen: FHT present  Fetal Surveillance:  reactive nst, cat 1  Sterile vaginal exam: deferred      Results:     Lab Results   Component Value Date    TREPONEMALAB Nonreactive 10/22/2024    ABO O 07/05/2024    RH Positive 07/05/2024    WBC 8.5 10/22/2024    HGB 12.0 10/22/2024    HCT 34.8 (L) 10/22/2024    .0 10/22/2024       Lab Results   Component Value Date    COLORUR Yellow 07/05/2024    CLARITY Ex.Turbid (A) 07/05/2024    SPECGRAVITY 1.017 07/05/2024    PROUR Negative 07/05/2024    GLUUR Normal 07/05/2024    KETUR Negative 07/05/2024    BILUR Negative 07/05/2024    BLOODURINE Negative 07/05/2024    NITRITE Negative 07/05/2024    UROBILINOGEN Normal 07/05/2024    LEUUR Negative 07/05/2024       No results found.      Assessment/Plan:    Shirley Blancas is at an estimated gestational age of 37w0d with an estimated date of delivery of:  Estimated Date of Delivery: 1/9/25    Not in labor.  Obstetrical history significant for advanced maternal age.    Admission problem(s):    Pt doing well.  Good fm. No c/o..  counseled extensively and all questions answered.     Baltazar Carreon MD  12/19/2024  8:45 AM         [1] No Known Allergies  [2]   Medications Prior to Admission   Medication Sig Dispense Refill Last Dose/Taking    diphenhydrAMINE 25 MG Oral Cap Take 1 capsule (25 mg total) by mouth every 6 (six) hours as needed for Sleep (Headache). 16 capsule 0 12/18/2024 Bedtime    magnesium oxide 400 MG Oral Tab Take 1 tablet (400 mg total) by mouth at bedtime. 30 tablet 1 12/18/2024 Bedtime    metoclopramide (REGLAN) 10 MG Oral Tab Take 1 tablet (10 mg total) by mouth every 6 (six) hours as needed. 20 tablet 0 12/18/2024 Bedtime    famotidine 20 MG Oral Tab Take 2 tablets (40 mg total) by mouth at bedtime. 60 tablet 5 12/18/2024 Bedtime     PRENATAL 27-0.8 MG Oral Tab TAKE 1 TABLET BY MOUTH DAILY 30 tablet 0 2024 Bedtime    Ferrous Sulfate 325 (65 Fe) MG Oral Tab Take 1 tablet (325 mg total) by mouth every other day. 90 tablet 1 2024 Bedtime    Aspirin 81 MG Oral Cap Take 2 tablets by mouth daily. 90 capsule 2 2024 Bedtime    Cholecalciferol (VITAMIN D) 50 MCG (2000 UT) Oral Tab Take 1 tablet by mouth daily. 90 tablet 1 2024 Bedtime    [] sulfamethoxazole-trimethoprim DS (BACTRIM DS) 800-160 MG Oral Tab per tablet Take 1 tablet by mouth 2 (two) times daily for 7 days. 14 tablet 0

## 2024-12-19 NOTE — NST
Nonstress Test   Patient: Shirley Blancas    Gestation: 37w0d    NST:       Variability: Moderate           Accelerations: Yes           Decelerations: None            Baseline: 140 BPM           Uterine Irritability: No           Contractions: Irregular                    Contraction Frequency: x2                   Acoustic Stimulator: No           Nonstress Test Interpretation: Reactive           Nonstress Test Second Interpretation: Reactive          FHR Category: Category I          Additional Comments Comments: Pt is a  at 37.0 here for scheduled NST due to AMA. NST reactive. Dr. Carreon informed of findings. Ok to discharge pt home. Pt provided with discharge instructions and labor precautions. Pt verbalizes understanding.

## 2024-12-19 NOTE — PROGRESS NOTES
Pt is a 34 year old female admitted to TR1/TR1-A.     Chief Complaint   Patient presents with    Non-stress Test     Pt here for scheduled NST due to AMA. Pt states she is feeling baby moving. Pt denies having regular contraction pain, leaking of fluid or vaginal bleeding.      Pt is  37w0d intra-uterine pregnancy.  History obtained, consents signed. Oriented to room, staff, and plan of care.

## 2024-12-19 NOTE — PROGRESS NOTES
Shirley Blancas is a 34 year old female  Patient's last menstrual period was 2024 (exact date).   Chief Complaint   Patient presents with    Prenatal Care       OBSTETRICS HISTORY:     OB History    Para Term  AB Living   1             SAB IAB Ectopic Multiple Live Births                  # Outcome Date GA Lbr Pierce/2nd Weight Sex Type Anes PTL Lv   1 Current                GYNE HISTORY:         Menarche: 12 (2024  9:06 AM)  Period Cycle (Days): 28-30 (2024  9:06 AM)  Period Duration (Days): 5 (2024  9:06 AM)  Period Flow: Light (2024  9:06 AM)  Use of Birth Control (if yes, specify type): OCP (2024  9:06 AM)  Date When Birth Control Last Used: 2023 (2024  9:06 AM)  Hx Prior Abnormal Pap: No (2024  9:06 AM)  Pap Date:  () (2024  9:06 AM)        Latest Ref Rng & Units 2024    10:39 AM   RECENT PAP RESULTS   INTERPRETATION/RESULT: Negative for intraepithelial lesion or malignancy Negative for intraepithelial lesion or malignancy    HPV Negative Negative          MEDICAL HISTORY:     History reviewed. No pertinent past medical history.    SURGICAL HISTORY:     History reviewed. No pertinent surgical history.    SOCIAL HISTORY:     Social History     Socioeconomic History    Marital status: Single   Tobacco Use    Smoking status: Never    Smokeless tobacco: Never   Vaping Use    Vaping status: Never Used   Substance and Sexual Activity    Drug use: Never     Social Drivers of Health     Financial Resource Strain: Low Risk  (2024)    Financial Resource Strain     Difficulty of Paying Living Expenses: Not hard at all     Med Affordability: No   Food Insecurity: No Food Insecurity (2024)    Food Insecurity     Food Insecurity: Never true   Transportation Needs: No Transportation Needs (2024)    Transportation Needs     Lack of Transportation: No   Stress: No Stress Concern Present (2024)    Stress     Feeling of  Stress : No   Housing Stability: Low Risk  (6/28/2024)    Housing Stability     Housing Instability: No        FAMILY HISTORY:     Family History   Problem Relation Age of Onset    Other (lupus) Mother     Other (hypothyroidism) Mother     No Known Problems Maternal Grandmother     No Known Problems Maternal Grandfather     No Known Problems Paternal Grandmother     No Known Problems Paternal Grandfather        MEDICATIONS:       Current Outpatient Medications:     diphenhydrAMINE 25 MG Oral Cap, Take 1 capsule (25 mg total) by mouth every 6 (six) hours as needed for Sleep (Headache)., Disp: 16 capsule, Rfl: 0    magnesium oxide 400 MG Oral Tab, Take 1 tablet (400 mg total) by mouth at bedtime., Disp: 30 tablet, Rfl: 1    metoclopramide (REGLAN) 10 MG Oral Tab, Take 1 tablet (10 mg total) by mouth every 6 (six) hours as needed., Disp: 20 tablet, Rfl: 0    famotidine 20 MG Oral Tab, Take 2 tablets (40 mg total) by mouth at bedtime., Disp: 60 tablet, Rfl: 5    PRENATAL 27-0.8 MG Oral Tab, TAKE 1 TABLET BY MOUTH DAILY, Disp: 30 tablet, Rfl: 0    Ferrous Sulfate 325 (65 Fe) MG Oral Tab, Take 1 tablet (325 mg total) by mouth every other day., Disp: 90 tablet, Rfl: 1    Aspirin 81 MG Oral Cap, Take 2 tablets by mouth daily., Disp: 90 capsule, Rfl: 2    Cholecalciferol (VITAMIN D) 50 MCG (2000 UT) Oral Tab, Take 1 tablet by mouth daily., Disp: 90 tablet, Rfl: 1    ALLERGIES:     Allergies[1]      REVIEW OF SYSTEMS:     Constitutional:    denies fever / chills  Cardiovascular:  denies chest pain or palpitations  Respiratory:    denies shortness of breath  Gastrointestinal:  denies severe abdominal pain, frequent diarrhea or constipation, nausea / vomiting  Genitourinary:    denies dysuria, bothersome incontinence  Skin/Breast:   denies any breast pain, lumps, or discharge  Neurological:    denies frequent severe headaches  Psychiatric:   denies depression or anxiety, thoughts of harming self or others      PHYSICAL EXAM:    Blood pressure 126/82, weight 179 lb 6.4 oz (81.4 kg), last menstrual period 04/04/2024.  Constitutional:  well developed, well nourished, no distress  Abdomen:   soft, gravid, nontender  Musculoskeletal: no cva tenderness bilaterally  Skin/Hair:  no unusual rashes or bruises  Extremities:  no edema, no cyanosis, non tender bilaterally  Psychiatric:   oriented to time, place, person and situation. Appropriate mood and affect      ASSESSMENT & PLAN:     [place] was seen today for prenatal care.    Diagnoses and all orders for this visit:    Supervision of high-risk pregnancy of elderly primigravida (HCC)  -     US OB LIMITED FOR ONE OR MORE FETUS EMG ONLY; Future  -     US OB LIMITED FOR ONE OR MORE FETUS EMG ONLY      Fetal kick counts discussed with  patient, labor precautions given.  Marylou wnl today for ama    FOLLOW-UP     No follow-ups on file.      Naresh Crawford MD  12/19/2024         [1] No Known Allergies

## 2024-12-26 ENCOUNTER — HOSPITAL ENCOUNTER (OUTPATIENT)
Facility: HOSPITAL | Age: 34
Discharge: HOME | End: 2024-12-26
Attending: STUDENT IN AN ORGANIZED HEALTH CARE EDUCATION/TRAINING PROGRAM | Admitting: STUDENT IN AN ORGANIZED HEALTH CARE EDUCATION/TRAINING PROGRAM
Payer: MEDICAID

## 2024-12-26 ENCOUNTER — APPOINTMENT (OUTPATIENT)
Dept: OBGYN CLINIC | Facility: HOSPITAL | Age: 34
End: 2024-12-26
Payer: MEDICAID

## 2024-12-26 DIAGNOSIS — Z34.90 PREGNANCY (HCC): ICD-10-CM

## 2024-12-26 PROCEDURE — 59025 FETAL NON-STRESS TEST: CPT

## 2024-12-26 RX ORDER — ACETAMINOPHEN 500 MG
1000 TABLET ORAL ONCE
Status: COMPLETED | OUTPATIENT
Start: 2024-12-26 | End: 2024-12-26

## 2024-12-26 NOTE — TRIAGE
[facility]  part of [facility]      Triage Note    Shirley Blancas Patient Status:  Outpatient    1990 MRN [id number]   Location [facility] Attending Khloe Orr MD   Hosp Day # 0 PCP No primary care provider on file.      Para:   Estimated Date of Delivery: 25  Gestation: 38w0d    Chief Complaint    Non-stress Test         Allergies:  Allergies[1]    No orders of the defined types were placed in this encounter.      Lab Results   Component Value Date    WBC 8.5 10/22/2024    HGB 12.0 10/22/2024    HCT 34.8 (L) 10/22/2024    .0 10/22/2024       Clinitek UA  Lab Results   Component Value Date    GLUUR Normal 2024    SPECGRAVITY 1.017 2024    URINECUL No Growth at 18-24 hrs. 2024       UA  Lab Results   Component Value Date    COLORUR Yellow 2024    CLARITY Ex.Turbid (A) 2024    SPECGRAVITY 1.017 2024    PROUR Negative 2024    GLUUR Normal 2024    KETUR Negative 2024    BILUR Negative 2024    BLOODURINE Negative 2024    NITRITE Negative 2024    UROBILINOGEN Normal 2024    LEUUR Negative 2024       There were no vitals filed for this visit.    NST                                                                                                                    Nonstress Test Interpretation: Reactive           Nonstress Test Second Interpretation: Reactive                        Additional Comments   Pt presented to TR 3 for scheduled NST-AMA. Pt denied LOF, vag bleeding and states + fetal movements at this time. Pt did c/o HA 4/10 that started this morning. PO tylenol given-pt stated relief. NST reactive, vitals WNL. Dr Orr informed of results-VTOR. Pt discharged home in stable condition. Labor/pre-e precautions given/explained-pt verbalizes understanding. Pt to follow up at next scheduled appointment.     Chief Complaint    Patient presents with    Non-stress Test         Florinda OJEDA RN  12/26/2024 2:08 PM         [1] No Known Allergies

## 2024-12-27 ENCOUNTER — ROUTINE PRENATAL (OUTPATIENT)
Dept: OBGYN CLINIC | Facility: CLINIC | Age: 34
End: 2024-12-27

## 2024-12-27 VITALS
HEART RATE: 67 BPM | SYSTOLIC BLOOD PRESSURE: 110 MMHG | BODY MASS INDEX: 27 KG/M2 | DIASTOLIC BLOOD PRESSURE: 70 MMHG | WEIGHT: 177 LBS

## 2024-12-27 DIAGNOSIS — O09.519 SUPERVISION OF HIGH-RISK PREGNANCY OF ELDERLY PRIMIGRAVIDA (HCC): Primary | ICD-10-CM

## 2024-12-27 LAB
APPEARANCE: CLEAR
BILIRUBIN: NEGATIVE
GLUCOSE (URINE DIPSTICK): NEGATIVE MG/DL
KETONES (URINE DIPSTICK): NEGATIVE MG/DL
MULTISTIX LOT#: ABNORMAL NUMERIC
NITRITE, URINE: POSITIVE
OCCULT BLOOD: NEGATIVE
PH, URINE: 7 (ref 4.5–8)
PROTEIN (URINE DIPSTICK): NEGATIVE MG/DL
SPECIFIC GRAVITY: 1.02 (ref 1–1.03)
URINE-COLOR: YELLOW
UROBILINOGEN,SEMI-QN: 0.2 MG/DL (ref 0–1.9)

## 2024-12-27 PROCEDURE — 81003 URINALYSIS AUTO W/O SCOPE: CPT | Performed by: OBSTETRICS & GYNECOLOGY

## 2024-12-27 PROCEDURE — 99213 OFFICE O/P EST LOW 20 MIN: CPT | Performed by: OBSTETRICS & GYNECOLOGY

## 2024-12-27 NOTE — PROGRESS NOTES
Shirley Sousa Ruy Blancas is a 34 year old female  Patient's last menstrual period was 2024 (exact date).   Chief Complaint   Patient presents with    Prenatal Care     Pt here for prenatal visit        OBSTETRICS HISTORY:     OB History    Para Term  AB Living   1             SAB IAB Ectopic Multiple Live Births                  # Outcome Date GA Lbr Pierce/2nd Weight Sex Type Anes PTL Lv   1 Current                GYNE HISTORY:         Menarche: 12 (2024  9:06 AM)  Period Cycle (Days): 28-30 (2024  9:06 AM)  Period Duration (Days): 5 (2024  9:06 AM)  Period Flow: Light (2024  9:06 AM)  Use of Birth Control (if yes, specify type): OCP (2024  9:06 AM)  Date When Birth Control Last Used: 2023 (2024  9:06 AM)  Hx Prior Abnormal Pap: No (2024  9:06 AM)  Pap Date:  () (2024  9:06 AM)        Latest Ref Rng & Units 2024    10:39 AM   RECENT PAP RESULTS   INTERPRETATION/RESULT: Negative for intraepithelial lesion or malignancy Negative for intraepithelial lesion or malignancy    HPV Negative Negative          MEDICAL HISTORY:     History reviewed. No pertinent past medical history.    SURGICAL HISTORY:     History reviewed. No pertinent surgical history.    SOCIAL HISTORY:     Social History     Socioeconomic History    Marital status: Single   Tobacco Use    Smoking status: Never    Smokeless tobacco: Never   Vaping Use    Vaping status: Never Used   Substance and Sexual Activity    Drug use: Never     Social Drivers of Health     Financial Resource Strain: Low Risk  (2024)    Financial Resource Strain     Difficulty of Paying Living Expenses: Not hard at all     Med Affordability: No   Food Insecurity: No Food Insecurity (2024)    Food Insecurity     Food Insecurity: Never true   Transportation Needs: No Transportation Needs (2024)    Transportation Needs     Lack of Transportation: No   Stress: No Stress Concern Present  (6/28/2024)    Stress     Feeling of Stress : No   Housing Stability: Low Risk  (6/28/2024)    Housing Stability     Housing Instability: No        FAMILY HISTORY:     Family History   Problem Relation Age of Onset    Other (lupus) Mother     Other (hypothyroidism) Mother     No Known Problems Maternal Grandmother     No Known Problems Maternal Grandfather     No Known Problems Paternal Grandmother     No Known Problems Paternal Grandfather        MEDICATIONS:       Current Outpatient Medications:     diphenhydrAMINE 25 MG Oral Cap, Take 1 capsule (25 mg total) by mouth every 6 (six) hours as needed for Sleep (Headache)., Disp: 16 capsule, Rfl: 0    magnesium oxide 400 MG Oral Tab, Take 1 tablet (400 mg total) by mouth at bedtime., Disp: 30 tablet, Rfl: 1    famotidine 20 MG Oral Tab, Take 2 tablets (40 mg total) by mouth at bedtime., Disp: 60 tablet, Rfl: 5    PRENATAL 27-0.8 MG Oral Tab, TAKE 1 TABLET BY MOUTH DAILY, Disp: 30 tablet, Rfl: 0    Ferrous Sulfate 325 (65 Fe) MG Oral Tab, Take 1 tablet (325 mg total) by mouth every other day., Disp: 90 tablet, Rfl: 1    Aspirin 81 MG Oral Cap, Take 2 tablets by mouth daily., Disp: 90 capsule, Rfl: 2    Cholecalciferol (VITAMIN D) 50 MCG (2000 UT) Oral Tab, Take 1 tablet by mouth daily., Disp: 90 tablet, Rfl: 1    ALLERGIES:     Allergies[1]      REVIEW OF SYSTEMS:     Constitutional:    denies fever / chills  Cardiovascular:  denies chest pain or palpitations  Respiratory:    denies shortness of breath  Gastrointestinal:  denies severe abdominal pain, frequent diarrhea or constipation, nausea / vomiting  Genitourinary:    denies dysuria, bothersome incontinence  Skin/Breast:   denies any breast pain, lumps, or discharge  Neurological:    denies frequent severe headaches  Psychiatric:   denies depression or anxiety, thoughts of harming self or others      PHYSICAL EXAM:   Blood pressure 110/70, pulse 67, weight 177 lb (80.3 kg), last menstrual period  04/04/2024.  Constitutional:  well developed, well nourished, no distress  Abdomen:   soft, gravid, nontender  Musculoskeletal: no cva tenderness bilaterally  Skin/Hair:  no unusual rashes or bruises  Extremities:  no edema, no cyanosis, non tender bilaterally  Psychiatric:   oriented to time, place, person and situation. Appropriate mood and affect      ASSESSMENT & PLAN:     [place] was seen today for prenatal care.    Diagnoses and all orders for this visit:    Supervision of high-risk pregnancy of elderly primigravida ([facility])  -     POC Urinalysis, Automated Dip without microscopy (PCA and EMMG ONLY) [15501]      Fetal kick counts discussed with  patient, labor precautions given.      FOLLOW-UP     No follow-ups on file.      Naresh Crawford MD  12/27/2024         [1] No Known Allergies

## 2025-01-02 ENCOUNTER — APPOINTMENT (OUTPATIENT)
Dept: OBGYN CLINIC | Facility: HOSPITAL | Age: 35
End: 2025-01-02
Payer: MEDICAID

## 2025-01-02 ENCOUNTER — HOSPITAL ENCOUNTER (OUTPATIENT)
Facility: HOSPITAL | Age: 35
Discharge: HOME | End: 2025-01-02
Attending: OBSTETRICS & GYNECOLOGY | Admitting: OBSTETRICS & GYNECOLOGY
Payer: MEDICAID

## 2025-01-02 ENCOUNTER — NST DOCUMENTATION (OUTPATIENT)
Dept: OBGYN CLINIC | Facility: CLINIC | Age: 35
End: 2025-01-02

## 2025-01-02 ENCOUNTER — ROUTINE PRENATAL (OUTPATIENT)
Dept: OBGYN CLINIC | Facility: CLINIC | Age: 35
End: 2025-01-02

## 2025-01-02 VITALS
DIASTOLIC BLOOD PRESSURE: 70 MMHG | WEIGHT: 182 LBS | HEART RATE: 85 BPM | SYSTOLIC BLOOD PRESSURE: 110 MMHG | BODY MASS INDEX: 28 KG/M2

## 2025-01-02 VITALS — DIASTOLIC BLOOD PRESSURE: 64 MMHG | SYSTOLIC BLOOD PRESSURE: 120 MMHG | HEART RATE: 73 BPM

## 2025-01-02 DIAGNOSIS — Z34.90 PREGNANCY (HCC): ICD-10-CM

## 2025-01-02 DIAGNOSIS — O09.519 SUPERVISION OF HIGH-RISK PREGNANCY OF ELDERLY PRIMIGRAVIDA (HCC): Primary | ICD-10-CM

## 2025-01-02 PROCEDURE — 99212 OFFICE O/P EST SF 10 MIN: CPT | Performed by: OBSTETRICS & GYNECOLOGY

## 2025-01-02 PROCEDURE — 59025 FETAL NON-STRESS TEST: CPT | Performed by: STUDENT IN AN ORGANIZED HEALTH CARE EDUCATION/TRAINING PROGRAM

## 2025-01-02 PROCEDURE — 76815 OB US LIMITED FETUS(S): CPT | Performed by: OBSTETRICS & GYNECOLOGY

## 2025-01-02 PROCEDURE — 59025 FETAL NON-STRESS TEST: CPT

## 2025-01-02 NOTE — PROGRESS NOTES
Shirley Blancas is a 34 year old female  Patient's last menstrual period was 2024 (exact date).   Chief Complaint   Patient presents with    Prenatal Care       OBSTETRICS HISTORY:     OB History    Para Term  AB Living   1             SAB IAB Ectopic Multiple Live Births                  # Outcome Date GA Lbr Pierce/2nd Weight Sex Type Anes PTL Lv   1 Current                GYNE HISTORY:         Menarche: 12 (2024  9:06 AM)  Period Cycle (Days): 28-30 (2024  9:06 AM)  Period Duration (Days): 5 (2024  9:06 AM)  Period Flow: Light (2024  9:06 AM)  Use of Birth Control (if yes, specify type): OCP (2024  9:06 AM)  Date When Birth Control Last Used: 2023 (2024  9:06 AM)  Hx Prior Abnormal Pap: No (2024  9:06 AM)  Pap Date:  () (2024  9:06 AM)        Latest Ref Rng & Units 2024    10:39 AM   RECENT PAP RESULTS   INTERPRETATION/RESULT: Negative for intraepithelial lesion or malignancy Negative for intraepithelial lesion or malignancy    HPV Negative Negative          MEDICAL HISTORY:     History reviewed. No pertinent past medical history.    SURGICAL HISTORY:     History reviewed. No pertinent surgical history.    SOCIAL HISTORY:     Social History     Socioeconomic History    Marital status: Single   Tobacco Use    Smoking status: Never    Smokeless tobacco: Never   Vaping Use    Vaping status: Never Used   Substance and Sexual Activity    Drug use: Never     Social Drivers of Health     Financial Resource Strain: Low Risk  (2024)    Financial Resource Strain     Difficulty of Paying Living Expenses: Not hard at all     Med Affordability: No   Food Insecurity: No Food Insecurity (2024)    Food Insecurity     Food Insecurity: Never true   Transportation Needs: No Transportation Needs (2024)    Transportation Needs     Lack of Transportation: No   Stress: No Stress Concern Present (2024)    Stress     Feeling of  Stress : No   Housing Stability: Low Risk  (6/28/2024)    Housing Stability     Housing Instability: No        FAMILY HISTORY:     Family History   Problem Relation Age of Onset    Other (lupus) Mother     Other (hypothyroidism) Mother     No Known Problems Maternal Grandmother     No Known Problems Maternal Grandfather     No Known Problems Paternal Grandmother     No Known Problems Paternal Grandfather        MEDICATIONS:       Current Outpatient Medications:     diphenhydrAMINE 25 MG Oral Cap, Take 1 capsule (25 mg total) by mouth every 6 (six) hours as needed for Sleep (Headache)., Disp: 16 capsule, Rfl: 0    magnesium oxide 400 MG Oral Tab, Take 1 tablet (400 mg total) by mouth at bedtime., Disp: 30 tablet, Rfl: 1    famotidine 20 MG Oral Tab, Take 2 tablets (40 mg total) by mouth at bedtime., Disp: 60 tablet, Rfl: 5    PRENATAL 27-0.8 MG Oral Tab, TAKE 1 TABLET BY MOUTH DAILY, Disp: 30 tablet, Rfl: 0    Ferrous Sulfate 325 (65 Fe) MG Oral Tab, Take 1 tablet (325 mg total) by mouth every other day., Disp: 90 tablet, Rfl: 1    Aspirin 81 MG Oral Cap, Take 2 tablets by mouth daily., Disp: 90 capsule, Rfl: 2    Cholecalciferol (VITAMIN D) 50 MCG (2000 UT) Oral Tab, Take 1 tablet by mouth daily., Disp: 90 tablet, Rfl: 1    ALLERGIES:     Allergies[1]      REVIEW OF SYSTEMS:     Constitutional:    denies fever / chills  Cardiovascular:  denies chest pain or palpitations  Respiratory:    denies shortness of breath  Gastrointestinal:  denies severe abdominal pain, frequent diarrhea or constipation, nausea / vomiting  Genitourinary:    denies dysuria, bothersome incontinence  Skin/Breast:   denies any breast pain, lumps, or discharge  Neurological:    denies frequent severe headaches  Psychiatric:   denies depression or anxiety, thoughts of harming self or others      PHYSICAL EXAM:   Blood pressure 110/70, pulse 85, weight 182 lb (82.6 kg), last menstrual period 04/04/2024.  Constitutional:  well developed, well  nourished, no distress  Abdomen:   soft, gravid, nontender  Musculoskeletal: no cva tenderness bilaterally  Skin/Hair:  no unusual rashes or bruises  Extremities:  no edema, no cyanosis, non tender bilaterally  Psychiatric:   oriented to time, place, person and situation. Appropriate mood and affect      ASSESSMENT & PLAN:     [place] was seen today for prenatal care.    Diagnoses and all orders for this visit:    Supervision of high-risk pregnancy of elderly primigravida (HCC)    Marylou for ama wnl today  Fetal kick counts discussed with  patient, labor precautions given.  Iol 8 days      FOLLOW-UP     No follow-ups on file.      Naresh Crawford MD  1/2/2025         [1] No Known Allergies

## 2025-01-02 NOTE — NST
Nonstress Test   Patient: Shirley Blancas    Gestation: 39w0d    Diagnosis from order: Inpatient order, no diagnosis associated    Problem List:   Patient Active Problem List   Diagnosis    Supervision of high-risk pregnancy of elderly primigravida (HCC)    Acute non intractable tension-type headache       NST:        2025   NST DOCUMENTATION   Variability 6-25 BPM   Accelerations Yes   Decelerations None   Baseline 135 BPM   Uterine Irritability No   Contractions Irregular   Acoustic Stimulator No   Nonstress Test Interpretation Reactive   Nonstress Test Second Interpretation Reactive   FHR Category Category I   Comments +FM   NST Completed by William HOLMAN   [facility]    Testing Plan Weekly NST   Provider Notified Kirby HAGAN         I agree with the above evaluation. NST completed.  Khloe Orr MD, MD  2025  1:24 PM

## 2025-01-07 ENCOUNTER — TELEPHONE (OUTPATIENT)
Dept: OBGYN UNIT | Facility: HOSPITAL | Age: 35
End: 2025-01-07

## 2025-01-08 ENCOUNTER — ORDERS FOR ADMISSION (OUTPATIENT)
Dept: OBGYN CLINIC | Facility: CLINIC | Age: 35
End: 2025-01-08

## 2025-01-08 RX ORDER — HYDROXYZINE HYDROCHLORIDE 50 MG/ML
50 INJECTION, SOLUTION INTRAMUSCULAR EVERY 6 HOURS PRN
Status: CANCELLED | OUTPATIENT
Start: 2025-01-08

## 2025-01-08 RX ORDER — NALBUPHINE HYDROCHLORIDE 10 MG/ML
10 INJECTION INTRAMUSCULAR; INTRAVENOUS; SUBCUTANEOUS EVERY 6 HOURS PRN
Status: CANCELLED | OUTPATIENT
Start: 2025-01-08

## 2025-01-08 RX ORDER — DIPHENHYDRAMINE HYDROCHLORIDE 50 MG/ML
25 INJECTION INTRAMUSCULAR; INTRAVENOUS NIGHTLY PRN
Status: CANCELLED | OUTPATIENT
Start: 2025-01-08

## 2025-01-08 RX ORDER — LIDOCAINE HYDROCHLORIDE 10 MG/ML
30 INJECTION, SOLUTION EPIDURAL; INFILTRATION; INTRACAUDAL; PERINEURAL ONCE
Status: CANCELLED | OUTPATIENT
Start: 2025-01-08 | End: 2025-01-08

## 2025-01-08 RX ORDER — DEXTROSE, SODIUM CHLORIDE, SODIUM LACTATE, POTASSIUM CHLORIDE, AND CALCIUM CHLORIDE 5; .6; .31; .03; .02 G/100ML; G/100ML; G/100ML; G/100ML; G/100ML
INJECTION, SOLUTION INTRAVENOUS CONTINUOUS
Status: CANCELLED | OUTPATIENT
Start: 2025-01-08

## 2025-01-08 RX ORDER — SODIUM CHLORIDE, SODIUM LACTATE, POTASSIUM CHLORIDE, CALCIUM CHLORIDE 600; 310; 30; 20 MG/100ML; MG/100ML; MG/100ML; MG/100ML
INJECTION, SOLUTION INTRAVENOUS AS NEEDED
Status: CANCELLED | OUTPATIENT
Start: 2025-01-08

## 2025-01-08 RX ORDER — ACETAMINOPHEN 500 MG
1000 TABLET ORAL EVERY 6 HOURS PRN
Status: CANCELLED | OUTPATIENT
Start: 2025-01-08

## 2025-01-08 RX ORDER — MISOPROSTOL 100 UG/1
25 TABLET ORAL
Status: CANCELLED | OUTPATIENT
Start: 2025-01-08 | End: 2025-01-10

## 2025-01-08 RX ORDER — CITRIC ACID/SODIUM CITRATE 334-500MG
30 SOLUTION, ORAL ORAL AS NEEDED
Status: CANCELLED | OUTPATIENT
Start: 2025-01-08

## 2025-01-08 RX ORDER — ACETAMINOPHEN 500 MG
500 TABLET ORAL EVERY 6 HOURS PRN
Status: CANCELLED | OUTPATIENT
Start: 2025-01-08

## 2025-01-08 RX ORDER — TERBUTALINE SULFATE 1 MG/ML
0.25 INJECTION, SOLUTION SUBCUTANEOUS AS NEEDED
Status: CANCELLED | OUTPATIENT
Start: 2025-01-08

## 2025-01-08 RX ORDER — CALCIUM CARBONATE 500 MG/1
1000 TABLET, CHEWABLE ORAL EVERY 4 HOURS PRN
Status: CANCELLED | OUTPATIENT
Start: 2025-01-08

## 2025-01-08 RX ORDER — ONDANSETRON 2 MG/ML
4 INJECTION INTRAMUSCULAR; INTRAVENOUS EVERY 6 HOURS PRN
Status: CANCELLED | OUTPATIENT
Start: 2025-01-08

## 2025-01-08 RX ORDER — IBUPROFEN 200 MG
600 TABLET ORAL ONCE AS NEEDED
Status: CANCELLED | OUTPATIENT
Start: 2025-01-08

## 2025-01-09 ENCOUNTER — NST DOCUMENTATION (OUTPATIENT)
Dept: OBGYN CLINIC | Facility: CLINIC | Age: 35
End: 2025-01-09

## 2025-01-09 ENCOUNTER — HOSPITAL ENCOUNTER (OUTPATIENT)
Facility: HOSPITAL | Age: 35
Discharge: HOME | End: 2025-01-09
Attending: STUDENT IN AN ORGANIZED HEALTH CARE EDUCATION/TRAINING PROGRAM | Admitting: STUDENT IN AN ORGANIZED HEALTH CARE EDUCATION/TRAINING PROGRAM
Payer: MEDICAID

## 2025-01-09 ENCOUNTER — APPOINTMENT (OUTPATIENT)
Dept: OBGYN CLINIC | Facility: HOSPITAL | Age: 35
End: 2025-01-09
Payer: MEDICAID

## 2025-01-09 DIAGNOSIS — O09.529 AMA (ADVANCED MATERNAL AGE) MULTIGRAVIDA 35+ (HCC): ICD-10-CM

## 2025-01-09 DIAGNOSIS — O09.519 SUPERVISION OF HIGH-RISK PREGNANCY OF ELDERLY PRIMIGRAVIDA (HCC): Primary | ICD-10-CM

## 2025-01-09 PROCEDURE — 59025 FETAL NON-STRESS TEST: CPT | Performed by: STUDENT IN AN ORGANIZED HEALTH CARE EDUCATION/TRAINING PROGRAM

## 2025-01-09 PROCEDURE — 59025 FETAL NON-STRESS TEST: CPT

## 2025-01-10 ENCOUNTER — ANESTHESIA EVENT (OUTPATIENT)
Dept: OBGYN UNIT | Facility: HOSPITAL | Age: 35
End: 2025-01-10
Payer: MEDICAID

## 2025-01-10 ENCOUNTER — ANESTHESIA (OUTPATIENT)
Dept: OBGYN UNIT | Facility: HOSPITAL | Age: 35
End: 2025-01-10
Payer: MEDICAID

## 2025-01-10 ENCOUNTER — HOSPITAL ENCOUNTER (INPATIENT)
Facility: HOSPITAL | Age: 35
LOS: 3 days | Discharge: HOME | End: 2025-01-13
Attending: STUDENT IN AN ORGANIZED HEALTH CARE EDUCATION/TRAINING PROGRAM | Admitting: STUDENT IN AN ORGANIZED HEALTH CARE EDUCATION/TRAINING PROGRAM
Payer: MEDICAID

## 2025-01-10 ENCOUNTER — APPOINTMENT (OUTPATIENT)
Dept: OBGYN CLINIC | Facility: HOSPITAL | Age: 35
End: 2025-01-10
Attending: OBSTETRICS & GYNECOLOGY
Payer: MEDICAID

## 2025-01-10 PROBLEM — O09.523 ADVANCED MATERNAL AGE IN MULTIGRAVIDA, THIRD TRIMESTER (HCC): Status: ACTIVE | Noted: 2025-01-10

## 2025-01-10 PROBLEM — Z3A.40 40 WEEKS GESTATION OF PREGNANCY (HCC): Status: ACTIVE | Noted: 2025-01-10

## 2025-01-10 PROBLEM — O36.8390 FETAL HEART RATE NON-REASSURING AFFECTING MANAGEMENT OF MOTHER (HCC): Status: ACTIVE | Noted: 2025-01-10

## 2025-01-10 LAB
ALBUMIN SERPL-MCNC: 2.6 G/DL (ref 3.2–4.8)
ALBUMIN/GLOB SERPL: 1.2 {RATIO} (ref 1–2)
ALP LIVER SERPL-CCNC: 212 U/L
ALT SERPL-CCNC: <7 U/L
ANION GAP SERPL CALC-SCNC: 11 MMOL/L (ref 0–18)
ANTIBODY SCREEN: NEGATIVE
AST SERPL-CCNC: 14 U/L (ref ?–34)
BASOPHILS # BLD AUTO: 0.01 X10(3) UL (ref 0–0.2)
BASOPHILS # BLD AUTO: 0.03 X10(3) UL (ref 0–0.2)
BASOPHILS # BLD AUTO: 0.03 X10(3) UL (ref 0–0.2)
BASOPHILS NFR BLD AUTO: 0.2 %
BASOPHILS NFR BLD AUTO: 0.3 %
BASOPHILS NFR BLD AUTO: 0.3 %
BILIRUB SERPL-MCNC: 0.8 MG/DL (ref 0.3–1.2)
BUN BLD-MCNC: 13 MG/DL (ref 9–23)
BUN/CREAT SERPL: 13.8 (ref 10–20)
CALCIUM BLD-MCNC: 7.9 MG/DL (ref 8.7–10.4)
CHLORIDE SERPL-SCNC: 110 MMOL/L (ref 98–112)
CO2 SERPL-SCNC: 21 MMOL/L (ref 21–32)
CREAT BLD-MCNC: 0.94 MG/DL
DEPRECATED RDW RBC AUTO: 45.7 FL (ref 35.1–46.3)
DEPRECATED RDW RBC AUTO: 45.7 FL (ref 35.1–46.3)
DEPRECATED RDW RBC AUTO: 45.8 FL (ref 35.1–46.3)
EGFRCR SERPLBLD CKD-EPI 2021: 81 ML/MIN/1.73M2 (ref 60–?)
EOSINOPHIL # BLD AUTO: 0 X10(3) UL (ref 0–0.7)
EOSINOPHIL # BLD AUTO: 0.05 X10(3) UL (ref 0–0.7)
EOSINOPHIL # BLD AUTO: 0.05 X10(3) UL (ref 0–0.7)
EOSINOPHIL NFR BLD AUTO: 0 %
EOSINOPHIL NFR BLD AUTO: 0.5 %
EOSINOPHIL NFR BLD AUTO: 0.5 %
ERYTHROCYTE [DISTWIDTH] IN BLOOD BY AUTOMATED COUNT: 13.4 % (ref 11–15)
ERYTHROCYTE [DISTWIDTH] IN BLOOD BY AUTOMATED COUNT: 13.4 % (ref 11–15)
ERYTHROCYTE [DISTWIDTH] IN BLOOD BY AUTOMATED COUNT: 13.5 % (ref 11–15)
GLOBULIN PLAS-MCNC: 2.2 G/DL (ref 2–3.5)
GLUCOSE BLD-MCNC: 79 MG/DL (ref 70–99)
HCT VFR BLD AUTO: 32.4 %
HCT VFR BLD AUTO: 40.5 %
HCT VFR BLD AUTO: 40.5 %
HGB BLD-MCNC: 10.9 G/DL
HGB BLD-MCNC: 14.1 G/DL
HGB BLD-MCNC: 14.1 G/DL
IMM GRANULOCYTES # BLD AUTO: 0.02 X10(3) UL (ref 0–1)
IMM GRANULOCYTES # BLD AUTO: 0.04 X10(3) UL (ref 0–1)
IMM GRANULOCYTES # BLD AUTO: 0.04 X10(3) UL (ref 0–1)
IMM GRANULOCYTES NFR BLD: 0.4 %
LYMPHOCYTES # BLD AUTO: 0.23 X10(3) UL (ref 1–4)
LYMPHOCYTES # BLD AUTO: 2.73 X10(3) UL (ref 1–4)
LYMPHOCYTES # BLD AUTO: 2.73 X10(3) UL (ref 1–4)
LYMPHOCYTES NFR BLD AUTO: 26.1 %
LYMPHOCYTES NFR BLD AUTO: 26.1 %
LYMPHOCYTES NFR BLD AUTO: 5 %
MCH RBC QN AUTO: 31.6 PG (ref 26–34)
MCH RBC QN AUTO: 32.8 PG (ref 26–34)
MCH RBC QN AUTO: 32.8 PG (ref 26–34)
MCHC RBC AUTO-ENTMCNC: 33.6 G/DL (ref 31–37)
MCHC RBC AUTO-ENTMCNC: 34.8 G/DL (ref 31–37)
MCHC RBC AUTO-ENTMCNC: 34.8 G/DL (ref 31–37)
MCV RBC AUTO: 93.9 FL
MCV RBC AUTO: 94.2 FL
MCV RBC AUTO: 94.2 FL
MONOCYTES # BLD AUTO: 0 X10(3) UL (ref 0.1–1)
MONOCYTES # BLD AUTO: 0.74 X10(3) UL (ref 0.1–1)
MONOCYTES # BLD AUTO: 0.74 X10(3) UL (ref 0.1–1)
MONOCYTES NFR BLD AUTO: 0 %
MONOCYTES NFR BLD AUTO: 7.1 %
MONOCYTES NFR BLD AUTO: 7.1 %
NEUTROPHILS # BLD AUTO: 4.32 X10 (3) UL (ref 1.5–7.7)
NEUTROPHILS # BLD AUTO: 4.32 X10(3) UL (ref 1.5–7.7)
NEUTROPHILS # BLD AUTO: 6.86 X10 (3) UL (ref 1.5–7.7)
NEUTROPHILS # BLD AUTO: 6.86 X10 (3) UL (ref 1.5–7.7)
NEUTROPHILS # BLD AUTO: 6.86 X10(3) UL (ref 1.5–7.7)
NEUTROPHILS # BLD AUTO: 6.86 X10(3) UL (ref 1.5–7.7)
NEUTROPHILS NFR BLD AUTO: 65.6 %
NEUTROPHILS NFR BLD AUTO: 65.6 %
NEUTROPHILS NFR BLD AUTO: 94.4 %
OSMOLALITY SERPL CALC.SUM OF ELEC: 293 MOSM/KG (ref 275–295)
PLATELET # BLD AUTO: 159 10(3)UL (ref 150–450)
PLATELET # BLD AUTO: 232 10(3)UL (ref 150–450)
PLATELET # BLD AUTO: 232 10(3)UL (ref 150–450)
PLATELETS.RETICULATED NFR BLD AUTO: 5.1 % (ref 0–7)
POTASSIUM SERPL-SCNC: 4.3 MMOL/L (ref 3.5–5.1)
PROT SERPL-MCNC: 4.8 G/DL (ref 5.7–8.2)
RBC # BLD AUTO: 3.45 X10(6)UL
RBC # BLD AUTO: 4.3 X10(6)UL
RBC # BLD AUTO: 4.3 X10(6)UL
RH BLOOD TYPE: POSITIVE
SODIUM SERPL-SCNC: 142 MMOL/L (ref 136–145)
T PALLIDUM AB SER QL IA: NONREACTIVE
WBC # BLD AUTO: 10.5 X10(3) UL (ref 4–11)
WBC # BLD AUTO: 10.5 X10(3) UL (ref 4–11)
WBC # BLD AUTO: 4.6 X10(3) UL (ref 4–11)

## 2025-01-10 PROCEDURE — 59514 CESAREAN DELIVERY ONLY: CPT | Performed by: OBSTETRICS & GYNECOLOGY

## 2025-01-10 PROCEDURE — 3E0P7VZ INTRODUCTION OF HORMONE INTO FEMALE REPRODUCTIVE, VIA NATURAL OR ARTIFICIAL OPENING: ICD-10-PCS | Performed by: OBSTETRICS & GYNECOLOGY

## 2025-01-10 PROCEDURE — 3E033VJ INTRODUCTION OF OTHER HORMONE INTO PERIPHERAL VEIN, PERCUTANEOUS APPROACH: ICD-10-PCS | Performed by: OBSTETRICS & GYNECOLOGY

## 2025-01-10 RX ORDER — LOPERAMIDE HYDROCHLORIDE 2 MG/1
4 CAPSULE ORAL 4 TIMES DAILY PRN
Status: DISCONTINUED | OUTPATIENT
Start: 2025-01-10 | End: 2025-01-13

## 2025-01-10 RX ORDER — DEXTROSE, SODIUM CHLORIDE, SODIUM LACTATE, POTASSIUM CHLORIDE, AND CALCIUM CHLORIDE 5; .6; .31; .03; .02 G/100ML; G/100ML; G/100ML; G/100ML; G/100ML
INJECTION, SOLUTION INTRAVENOUS CONTINUOUS
Status: DISCONTINUED | OUTPATIENT
Start: 2025-01-10 | End: 2025-01-11 | Stop reason: HOSPADM

## 2025-01-10 RX ORDER — NALBUPHINE HYDROCHLORIDE 10 MG/ML
2.5 INJECTION INTRAMUSCULAR; INTRAVENOUS; SUBCUTANEOUS
Status: DISCONTINUED | OUTPATIENT
Start: 2025-01-10 | End: 2025-01-10

## 2025-01-10 RX ORDER — FAMOTIDINE 10 MG/ML
INJECTION, SOLUTION INTRAVENOUS
Status: DISCONTINUED
Start: 2025-01-10 | End: 2025-01-10 | Stop reason: WASHOUT

## 2025-01-10 RX ORDER — NALBUPHINE HYDROCHLORIDE 10 MG/ML
10 INJECTION INTRAMUSCULAR; INTRAVENOUS; SUBCUTANEOUS EVERY 6 HOURS PRN
Status: DISCONTINUED | OUTPATIENT
Start: 2025-01-10 | End: 2025-01-11 | Stop reason: HOSPADM

## 2025-01-10 RX ORDER — BUPIVACAINE HCL/0.9 % NACL/PF 0.25 %
5 PLASTIC BAG, INJECTION (ML) EPIDURAL AS NEEDED
Status: DISCONTINUED | OUTPATIENT
Start: 2025-01-10 | End: 2025-01-13

## 2025-01-10 RX ORDER — LIDOCAINE HYDROCHLORIDE AND EPINEPHRINE 15; 5 MG/ML; UG/ML
INJECTION, SOLUTION EPIDURAL
Status: COMPLETED | OUTPATIENT
Start: 2025-01-10 | End: 2025-01-10

## 2025-01-10 RX ORDER — NALBUPHINE HYDROCHLORIDE 10 MG/ML
2.5 INJECTION INTRAMUSCULAR; INTRAVENOUS; SUBCUTANEOUS EVERY 4 HOURS PRN
Status: ACTIVE | OUTPATIENT
Start: 2025-01-10 | End: 2025-01-11

## 2025-01-10 RX ORDER — PHENYLEPHRINE HCL 10 MG/ML
VIAL (ML) INJECTION AS NEEDED
Status: DISCONTINUED | OUTPATIENT
Start: 2025-01-10 | End: 2025-01-10 | Stop reason: SURG

## 2025-01-10 RX ORDER — PROCHLORPERAZINE EDISYLATE 5 MG/ML
5 INJECTION INTRAMUSCULAR; INTRAVENOUS ONCE AS NEEDED
Status: ACTIVE | OUTPATIENT
Start: 2025-01-10 | End: 2025-01-10

## 2025-01-10 RX ORDER — ONDANSETRON 2 MG/ML
4 INJECTION INTRAMUSCULAR; INTRAVENOUS ONCE AS NEEDED
Status: ACTIVE | OUTPATIENT
Start: 2025-01-10 | End: 2025-01-10

## 2025-01-10 RX ORDER — ACETAMINOPHEN 500 MG
500 TABLET ORAL EVERY 6 HOURS PRN
Status: DISCONTINUED | OUTPATIENT
Start: 2025-01-10 | End: 2025-01-11 | Stop reason: HOSPADM

## 2025-01-10 RX ORDER — ACETAMINOPHEN 500 MG
1000 TABLET ORAL EVERY 6 HOURS PRN
Status: DISCONTINUED | OUTPATIENT
Start: 2025-01-10 | End: 2025-01-11 | Stop reason: HOSPADM

## 2025-01-10 RX ORDER — HALOPERIDOL 5 MG/ML
0.5 INJECTION INTRAMUSCULAR ONCE AS NEEDED
Status: ACTIVE | OUTPATIENT
Start: 2025-01-10 | End: 2025-01-10

## 2025-01-10 RX ORDER — ONDANSETRON 2 MG/ML
4 INJECTION INTRAMUSCULAR; INTRAVENOUS EVERY 6 HOURS PRN
Status: DISCONTINUED | OUTPATIENT
Start: 2025-01-10 | End: 2025-01-11 | Stop reason: HOSPADM

## 2025-01-10 RX ORDER — DIPHENHYDRAMINE HCL 25 MG
25 CAPSULE ORAL EVERY 4 HOURS PRN
Status: ACTIVE | OUTPATIENT
Start: 2025-01-10 | End: 2025-01-11

## 2025-01-10 RX ORDER — HYDROCODONE BITARTRATE AND ACETAMINOPHEN 7.5; 325 MG/1; MG/1
2 TABLET ORAL EVERY 6 HOURS PRN
Status: ACTIVE | OUTPATIENT
Start: 2025-01-10 | End: 2025-01-11

## 2025-01-10 RX ORDER — BUPIVACAINE HYDROCHLORIDE 2.5 MG/ML
20 INJECTION, SOLUTION EPIDURAL; INFILTRATION; INTRACAUDAL ONCE
Status: DISCONTINUED | OUTPATIENT
Start: 2025-01-10 | End: 2025-01-10

## 2025-01-10 RX ORDER — SODIUM CHLORIDE, SODIUM LACTATE, POTASSIUM CHLORIDE, CALCIUM CHLORIDE 600; 310; 30; 20 MG/100ML; MG/100ML; MG/100ML; MG/100ML
INJECTION, SOLUTION INTRAVENOUS CONTINUOUS
Status: DISCONTINUED | OUTPATIENT
Start: 2025-01-10 | End: 2025-01-13

## 2025-01-10 RX ORDER — LIDOCAINE HYDROCHLORIDE 10 MG/ML
30 INJECTION, SOLUTION EPIDURAL; INFILTRATION; INTRACAUDAL; PERINEURAL ONCE
Status: DISCONTINUED | OUTPATIENT
Start: 2025-01-10 | End: 2025-01-11 | Stop reason: HOSPADM

## 2025-01-10 RX ORDER — SODIUM CHLORIDE 9 MG/ML
INJECTION, SOLUTION INTRAVENOUS CONTINUOUS PRN
Status: DISCONTINUED | OUTPATIENT
Start: 2025-01-10 | End: 2025-01-10 | Stop reason: SURG

## 2025-01-10 RX ORDER — MISOPROSTOL 200 UG/1
TABLET ORAL
Status: COMPLETED
Start: 2025-01-10 | End: 2025-01-10

## 2025-01-10 RX ORDER — DIPHENHYDRAMINE HYDROCHLORIDE 50 MG/ML
25 INJECTION INTRAMUSCULAR; INTRAVENOUS NIGHTLY PRN
Status: DISCONTINUED | OUTPATIENT
Start: 2025-01-10 | End: 2025-01-11 | Stop reason: HOSPADM

## 2025-01-10 RX ORDER — KETOROLAC TROMETHAMINE 30 MG/ML
30 INJECTION, SOLUTION INTRAMUSCULAR; INTRAVENOUS ONCE
Status: DISPENSED | OUTPATIENT
Start: 2025-01-10 | End: 2025-01-12

## 2025-01-10 RX ORDER — HYDROMORPHONE HYDROCHLORIDE 1 MG/ML
0.6 INJECTION, SOLUTION INTRAMUSCULAR; INTRAVENOUS; SUBCUTANEOUS
Status: ACTIVE | OUTPATIENT
Start: 2025-01-10 | End: 2025-01-11

## 2025-01-10 RX ORDER — CALCIUM CARBONATE 500 MG/1
1000 TABLET, CHEWABLE ORAL EVERY 4 HOURS PRN
Status: DISCONTINUED | OUTPATIENT
Start: 2025-01-10 | End: 2025-01-11 | Stop reason: HOSPADM

## 2025-01-10 RX ORDER — DEXAMETHASONE SODIUM PHOSPHATE 4 MG/ML
VIAL (ML) INJECTION AS NEEDED
Status: DISCONTINUED | OUTPATIENT
Start: 2025-01-10 | End: 2025-01-10 | Stop reason: SURG

## 2025-01-10 RX ORDER — HYDROXYZINE HYDROCHLORIDE 50 MG/ML
50 INJECTION, SOLUTION INTRAMUSCULAR EVERY 6 HOURS PRN
Status: DISCONTINUED | OUTPATIENT
Start: 2025-01-10 | End: 2025-01-11 | Stop reason: HOSPADM

## 2025-01-10 RX ORDER — BUPIVACAINE HCL/0.9 % NACL/PF 0.25 %
5 PLASTIC BAG, INJECTION (ML) EPIDURAL AS NEEDED
Status: DISCONTINUED | OUTPATIENT
Start: 2025-01-10 | End: 2025-01-10

## 2025-01-10 RX ORDER — LIDOCAINE HCL/EPINEPHRINE/PF 2%-1:200K
VIAL (ML) INJECTION AS NEEDED
Status: DISCONTINUED | OUTPATIENT
Start: 2025-01-10 | End: 2025-01-10 | Stop reason: SURG

## 2025-01-10 RX ORDER — NALBUPHINE HYDROCHLORIDE 10 MG/ML
2.5 INJECTION INTRAMUSCULAR; INTRAVENOUS; SUBCUTANEOUS
Status: DISCONTINUED | OUTPATIENT
Start: 2025-01-10 | End: 2025-01-13

## 2025-01-10 RX ORDER — CARBOPROST TROMETHAMINE 250 UG/ML
INJECTION, SOLUTION INTRAMUSCULAR
Status: COMPLETED
Start: 2025-01-10 | End: 2025-01-10

## 2025-01-10 RX ORDER — TERBUTALINE SULFATE 1 MG/ML
0.25 INJECTION, SOLUTION SUBCUTANEOUS AS NEEDED
Status: DISCONTINUED | OUTPATIENT
Start: 2025-01-10 | End: 2025-01-11 | Stop reason: HOSPADM

## 2025-01-10 RX ORDER — METOCLOPRAMIDE HYDROCHLORIDE 5 MG/ML
INJECTION INTRAMUSCULAR; INTRAVENOUS
Status: DISCONTINUED
Start: 2025-01-10 | End: 2025-01-10 | Stop reason: WASHOUT

## 2025-01-10 RX ORDER — BUPIVACAINE HYDROCHLORIDE 2.5 MG/ML
INJECTION, SOLUTION EPIDURAL; INFILTRATION; INTRACAUDAL
Status: COMPLETED | OUTPATIENT
Start: 2025-01-10 | End: 2025-01-10

## 2025-01-10 RX ORDER — HYDROMORPHONE HYDROCHLORIDE 1 MG/ML
0.4 INJECTION, SOLUTION INTRAMUSCULAR; INTRAVENOUS; SUBCUTANEOUS
Status: ACTIVE | OUTPATIENT
Start: 2025-01-10 | End: 2025-01-11

## 2025-01-10 RX ORDER — MORPHINE SULFATE 1 MG/ML
INJECTION, SOLUTION EPIDURAL; INTRATHECAL; INTRAVENOUS AS NEEDED
Status: DISCONTINUED | OUTPATIENT
Start: 2025-01-10 | End: 2025-01-10 | Stop reason: SURG

## 2025-01-10 RX ORDER — TRANEXAMIC ACID 10 MG/ML
INJECTION, SOLUTION INTRAVENOUS
Status: DISPENSED
Start: 2025-01-10 | End: 2025-01-11

## 2025-01-10 RX ORDER — SODIUM CHLORIDE, SODIUM LACTATE, POTASSIUM CHLORIDE, CALCIUM CHLORIDE 600; 310; 30; 20 MG/100ML; MG/100ML; MG/100ML; MG/100ML
INJECTION, SOLUTION INTRAVENOUS AS NEEDED
Status: DISCONTINUED | OUTPATIENT
Start: 2025-01-10 | End: 2025-01-11 | Stop reason: HOSPADM

## 2025-01-10 RX ORDER — TRANEXAMIC ACID 10 MG/ML
INJECTION, SOLUTION INTRAVENOUS AS NEEDED
Status: DISCONTINUED | OUTPATIENT
Start: 2025-01-10 | End: 2025-01-10 | Stop reason: SURG

## 2025-01-10 RX ORDER — ACETAMINOPHEN 325 MG/1
650 TABLET ORAL EVERY 6 HOURS PRN
Status: ACTIVE | OUTPATIENT
Start: 2025-01-10 | End: 2025-01-11

## 2025-01-10 RX ORDER — LIDOCAINE HYDROCHLORIDE 10 MG/ML
INJECTION, SOLUTION INFILTRATION; PERINEURAL
Status: COMPLETED | OUTPATIENT
Start: 2025-01-10 | End: 2025-01-10

## 2025-01-10 RX ORDER — NALOXONE HYDROCHLORIDE 0.4 MG/ML
0.08 INJECTION, SOLUTION INTRAMUSCULAR; INTRAVENOUS; SUBCUTANEOUS
Status: ACTIVE | OUTPATIENT
Start: 2025-01-10 | End: 2025-01-11

## 2025-01-10 RX ORDER — CITRIC ACID/SODIUM CITRATE 334-500MG
30 SOLUTION, ORAL ORAL AS NEEDED
Status: DISCONTINUED | OUTPATIENT
Start: 2025-01-10 | End: 2025-01-11 | Stop reason: HOSPADM

## 2025-01-10 RX ORDER — HYDROCODONE BITARTRATE AND ACETAMINOPHEN 7.5; 325 MG/1; MG/1
1 TABLET ORAL EVERY 6 HOURS PRN
Status: ACTIVE | OUTPATIENT
Start: 2025-01-10 | End: 2025-01-11

## 2025-01-10 RX ORDER — METHYLERGONOVINE MALEATE 0.2 MG/ML
INJECTION INTRAVENOUS
Status: DISCONTINUED
Start: 2025-01-10 | End: 2025-01-11 | Stop reason: WASHOUT

## 2025-01-10 RX ORDER — IBUPROFEN 600 MG/1
600 TABLET, FILM COATED ORAL ONCE AS NEEDED
Status: DISCONTINUED | OUTPATIENT
Start: 2025-01-10 | End: 2025-01-11 | Stop reason: HOSPADM

## 2025-01-10 RX ORDER — BUPIVACAINE HYDROCHLORIDE 2.5 MG/ML
20 INJECTION, SOLUTION EPIDURAL; INFILTRATION; INTRACAUDAL ONCE
Status: DISCONTINUED | OUTPATIENT
Start: 2025-01-10 | End: 2025-01-11 | Stop reason: HOSPADM

## 2025-01-10 RX ORDER — DIPHENHYDRAMINE HYDROCHLORIDE 50 MG/ML
12.5 INJECTION INTRAMUSCULAR; INTRAVENOUS EVERY 4 HOURS PRN
Status: ACTIVE | OUTPATIENT
Start: 2025-01-10 | End: 2025-01-11

## 2025-01-10 RX ADMIN — PHENYLEPHRINE HCL 200 MCG: 10 MG/ML VIAL (ML) INJECTION at 21:44:00

## 2025-01-10 RX ADMIN — ONDANSETRON 4 MG: 2 INJECTION INTRAMUSCULAR; INTRAVENOUS at 22:08:00

## 2025-01-10 RX ADMIN — LIDOCAINE HYDROCHLORIDE AND EPINEPHRINE 5 ML: 15; 5 INJECTION, SOLUTION EPIDURAL at 17:48:00

## 2025-01-10 RX ADMIN — LIDOCAINE HYDROCHLORIDE 5 ML: 10 INJECTION, SOLUTION INFILTRATION; PERINEURAL at 17:48:00

## 2025-01-10 RX ADMIN — DEXAMETHASONE SODIUM PHOSPHATE 4 MG: 4 MG/ML VIAL (ML) INJECTION at 22:08:00

## 2025-01-10 RX ADMIN — LIDOCAINE HCL/EPINEPHRINE/PF 20 ML: 2%-1:200K VIAL (ML) INJECTION at 21:31:00

## 2025-01-10 RX ADMIN — BUPIVACAINE HYDROCHLORIDE 5 ML: 2.5 INJECTION, SOLUTION EPIDURAL; INFILTRATION; INTRACAUDAL at 17:48:00

## 2025-01-10 RX ADMIN — PHENYLEPHRINE HCL 100 MCG: 10 MG/ML VIAL (ML) INJECTION at 21:53:00

## 2025-01-10 RX ADMIN — MORPHINE SULFATE 3 MG: 1 INJECTION, SOLUTION EPIDURAL; INTRATHECAL; INTRAVENOUS at 22:06:00

## 2025-01-10 RX ADMIN — TRANEXAMIC ACID 1000 MG: 10 INJECTION, SOLUTION INTRAVENOUS at 22:14:00

## 2025-01-10 RX ADMIN — SODIUM CHLORIDE: 9 INJECTION, SOLUTION INTRAVENOUS at 21:38:00

## 2025-01-10 NOTE — H&P
[facility]  part of [facility]    History and Physical Examination      Subjective   Chief Complaint:  IOL    HISTORY OF PRESENT ILLNESS:        Patient is a 34 year old y/o   @ 40w1d weeks presents for IOL. She notes + fetal movement, - vaginal bleeding, and  - ROM. Her prenatal course was ama. Her prenatal care is up to date and reviewed. Patient's GBS is Negative    Lab Results   Component Value Date    GBS Negative 2024           History reviewed. No pertinent past medical history.    History reviewed. No pertinent surgical history.    OB History    Para Term  AB Living   1 0 0 0 0 0   SAB IAB Ectopic Multiple Live Births   0 0 0 0 0         No current outpatient medications on file.    Allergies[1]    Social History     Socioeconomic History    Marital status: Single     Spouse name: Not on file    Number of children: Not on file    Years of education: Not on file    Highest education level: Not on file   Occupational History    Not on file   Tobacco Use    Smoking status: Never    Smokeless tobacco: Never   Vaping Use    Vaping status: Never Used   Substance and Sexual Activity    Alcohol use: Not on file    Drug use: Never    Sexual activity: Not on file   Other Topics Concern    Not on file   Social History Narrative    Not on file     Social Drivers of Health     Financial Resource Strain: Low Risk  (1/10/2025)    Financial Resource Strain     Difficulty of Paying Living Expenses: Not hard at all     Med Affordability: No   Food Insecurity: No Food Insecurity (1/10/2025)    Food Insecurity     Food Insecurity: Never true   Transportation Needs: No Transportation Needs (1/10/2025)    Transportation Needs     Lack of Transportation: No     Car Seat: Yes   Stress: No Stress Concern Present (1/10/2025)    Stress     Feeling of Stress : No   Housing Stability: Low Risk  (1/10/2025)    Housing Stability     Housing Instability: No     Housing Instability Emergency: Not  on file     Crib or Bassinette: Yes         Family History   Problem Relation Age of Onset    Other (lupus) Mother     Other (hypothyroidism) Mother     No Known Problems Maternal Grandmother     No Known Problems Maternal Grandfather     No Known Problems Paternal Grandmother     No Known Problems Paternal Grandfather            Prior to Admission medications    Medication Sig Start Date End Date Taking? Authorizing Provider   magnesium oxide 400 MG Oral Tab Take 1 tablet (400 mg total) by mouth at bedtime. 12/11/24  Yes Gisell Cope MD   famotidine 20 MG Oral Tab Take 2 tablets (40 mg total) by mouth at bedtime. 11/27/24  Yes Naresh Crawford MD   PRENATAL 27-0.8 MG Oral Tab TAKE 1 TABLET BY MOUTH DAILY 11/4/24  Yes Naresh Crawford MD   Ferrous Sulfate 325 (65 Fe) MG Oral Tab Take 1 tablet (325 mg total) by mouth every other day. 10/14/24  Yes Naresh Crawford MD   Aspirin 81 MG Oral Cap Take 2 tablets by mouth daily. 7/5/24  Yes Naresh Crawford MD   Cholecalciferol (VITAMIN D) 50 MCG (2000 UT) Oral Tab Take 1 tablet by mouth daily. 6/28/24  Yes Naresh Crawford MD   diphenhydrAMINE 25 MG Oral Cap Take 1 capsule (25 mg total) by mouth every 6 (six) hours as needed for Sleep (Headache). 12/11/24   Gisell Cope MD           Objective       ROS   Constitutional: Negative.  Negative for chills and fever.   Respiratory: Negative.  Negative for shortness of breath.    Cardiovascular: Negative for chest pain and palpitations.   Gastrointestinal: Negative for diarrhea, nausea and vomiting.   Genitourinary: Negative.  Negative for dysuria.   Neurological: Negative for dizziness.       Vitals:    Temp: 98.1 °F (36.7 °C)  Pulse: 68  Resp: 16  BP: 122/80     Physical Exam   Constitutional: She appears well-developed and well-nourished.   Cardiovascular: Normal rate.   Pulmonary/Chest: Effort normal.   Abdominal: Soft.   Genitourinary: Uterus normal.   Neurological: She is alert.   Skin: Skin is warm.    Psychiatric: She has a normal mood and affect.       CE:  0/0/-3/soft     .    EFM:   Baseline 135, + Accels, - Decels, mod Variability    [place]:  CTX irregular     BSUS: cephalic    Lab Results   Component Value Date    ABO BLOOD TYPE O 07/05/2024    RH BLOOD TYPE Positive 07/05/2024    HGB 12.0 10/22/2024    .0 10/22/2024    HCT 34.8 (L) 10/22/2024    Rubella IgG Positive 07/05/2024    Treponemal Antibodies Nonreactive 10/22/2024    HIV Antigen Antibody Combo Non-Reactive 10/22/2024            ASSESSMENT AND PLAN:      Active Problems:    Advanced maternal age in multigravida, third trimester (HCC)        Patient admitted for IOL. Start with cytotec. Cephalic on US.  Pain medication as desired  Good FM noted, fetal monitoring strip reviewed and reassuring.      Khloe Orr MD         [1] No Known Allergies

## 2025-01-10 NOTE — PLAN OF CARE
Problem: Patient Centered Care  Goal: Patient preferences are identified and integrated in the patient's plan of care  Description: Interventions:  - What would you like us to know as we care for you? Patient is having a baby boy, plans to breast feed and would like an epidural for pain management  - Provide timely, complete, and accurate information to patient/family  - Incorporate patient and family knowledge, values, beliefs, and cultural backgrounds into the planning and delivery of care  - Encourage patient/family to participate in care and decision-making at the level they choose  - Honor patient and family perspectives and choices  Outcome: Progressing     Problem: Patient/Family Goals  Goal: Patient/Family Long Term Goal  Description: Patient's Long Term Goal: Uncomplicated Delivery     Interventions:  - Assessment/Monitoring  - Induction/Augmentation per protocol and Provider order  - C/S per protocol and Provider order   - Education  - Intervention per protocol and Provider order with education   - Involve patient in POC  - See additional Care Plan goals for specific interventions     Outcome: Progressing  Goal: Patient/Family Short Term Goal  Description: Patient's Short Term Goal: Comfort and Pain Control     Interventions:   - Non Pharmacological pain intervention   - IV/IM and Epidural pain medication per Provider order and patient request  - Education  - Involve Patient in POC   - See additional Care Plan goals for specific interventions       Outcome: Progressing     Problem: BIRTH - VAGINAL/ SECTION  Goal: Fetal and maternal status remain reassuring during the birth process  Description: INTERVENTIONS:  - Monitor vital signs  - Monitor fetal heart rate  - Monitor uterine activity  - Monitor labor progression (vaginal delivery)  - DVT prophylaxis (C/S delivery)  - Surgical antibiotic prophylaxis (C/S delivery)  Outcome: Progressing     Problem: PAIN - ADULT  Goal: Verbalizes/displays  adequate comfort level or patient's stated pain goal  Description: INTERVENTIONS:  - Encourage pt to monitor pain and request assistance  - Assess pain using appropriate pain scale  - Administer analgesics based on type and severity of pain and evaluate response  - Implement non-pharmacological measures as appropriate and evaluate response  - Consider cultural and social influences on pain and pain management  - Manage/alleviate anxiety  - Utilize distraction and/or relaxation techniques  - Monitor for opioid side effects  - Notify MD/LIP if interventions unsuccessful or patient reports new pain  - Anticipate increased pain with activity and pre-medicate as appropriate  Outcome: Progressing     Problem: ANXIETY  Goal: Will report anxiety at manageable levels  Description: INTERVENTIONS:  - Administer medication as ordered  - Teach and rehearse alternative coping skills  - Provide emotional support with 1:1 interaction with staff  Outcome: Progressing

## 2025-01-10 NOTE — PROGRESS NOTES
[facility]  part of [facility]    Labor Progress Note    Shirley Blancas Patient Status:  Inpatient    1990 MRN [id number]   Location [facility] Attending Khloe Orr MD   Hosp Day # 0 PCP No primary care provider on file.        Subjective   Interval History:   No c/o        Objective   Vital signs in last 24 hours:  Temp:  [97.9 °F (36.6 °C)-98.3 °F (36.8 °C)] 97.9 °F (36.6 °C)  Pulse:  [68-70] 68  Resp:  [16-18] 18  BP: (111-122)/(77-84) 111/84    Input/Output:  No intake or output data in the 24 hours ending 01/10/25 1117    Fetal Surveillance:  Fetal heart tones:Fetal heart variability: moderate  Fetal Heart Rate decelerations: none  Fetal Heart Rate accelerations: yes        Sterile vaginal exam:  Dilation: closed   Effacement:  70    Station: -2   Position: Cephalic        Results:     Lab Results   Component Value Date    TREPONEMALAB Nonreactive 01/10/2025    ABO O 01/10/2025    RH Positive 01/10/2025    WBC 10.5 01/10/2025    WBC 10.5 01/10/2025    HGB 14.1 01/10/2025    HGB 14.1 01/10/2025    HCT 40.5 01/10/2025    HCT 40.5 01/10/2025    .0 01/10/2025    .0 01/10/2025       Lab Results   Component Value Date    COLORUR Yellow 2024    CLARITY Ex.Turbid (A) 2024    SPECGRAVITY 1.020 2024    PROUR Negative 2024    GLUUR Normal 2024    KETUR Negative 2024    BILUR Negative 2024    BLOODURINE Negative 2024    NITRITE Positive (A) 2024    UROBILINOGEN Normal 2024    LEUUR Negative 2024           Assessment/Plan     Advanced maternal age in multigravida, third trimester (HCC)  [place], post dates        Cont cytotec      Plan discussed with patient who verbalizes understanding and agreement.        Naresh Crawford MD  1/10/2025

## 2025-01-10 NOTE — ANESTHESIA PREPROCEDURE EVALUATION
Anesthesia PreOp Note    HPI:     Shirley Blancas is a 34 year old female who presents for preoperative consultation requested by: * No surgeons listed *    Date of Surgery: 1/10/2025    * No procedures listed *  Indication: * No pre-op diagnosis entered *    Relevant Problems   No relevant active problems       NPO:                         History Review:  Patient Active Problem List    Diagnosis Date Noted   • Advanced maternal age in multigravida, third trimester ([facility]) 01/10/2025   • Acute non intractable tension-type headache 12/12/2024   • Supervision of high-risk pregnancy of elderly primigravida ([facility]) 07/05/2024       History reviewed. No pertinent past medical history.    History reviewed. No pertinent surgical history.    Prescriptions Prior to Admission[1]  Current Medications and Prescriptions Ordered in Epic[2]    Allergies[3]    Family History   Problem Relation Age of Onset   • Other (lupus) Mother    • Other (hypothyroidism) Mother    • No Known Problems Maternal Grandmother    • No Known Problems Maternal Grandfather    • No Known Problems Paternal Grandmother    • No Known Problems Paternal Grandfather      Social History     Socioeconomic History   • Marital status: Single   Tobacco Use   • Smoking status: Never   • Smokeless tobacco: Never   Vaping Use   • Vaping status: Never Used   Substance and Sexual Activity   • Drug use: Never       Available pre-op labs reviewed.  Lab Results   Component Value Date    WBC 10.5 01/10/2025    WBC 10.5 01/10/2025    RBC 4.30 01/10/2025    RBC 4.30 01/10/2025    HGB 14.1 01/10/2025    HGB 14.1 01/10/2025    HCT 40.5 01/10/2025    HCT 40.5 01/10/2025    MCV 94.2 01/10/2025    MCV 94.2 01/10/2025    MCH 32.8 01/10/2025    MCH 32.8 01/10/2025    MCHC 34.8 01/10/2025    MCHC 34.8 01/10/2025    RDW 13.4 01/10/2025    RDW 13.4 01/10/2025    .0 01/10/2025    .0 01/10/2025             Vital Signs:  Body mass index is 27.9 kg/m².    weight is 82.6 kg (182 lb). Her oral temperature is 98 °F (36.7 °C). Her blood pressure is 136/69 and her pulse is 88. Her respiration is 16 and oxygen saturation is 99%.   Vitals:    01/10/25 1753 01/10/25 1755 01/10/25 1756 01/10/25 1757   BP:  122/67 122/67 136/69   Pulse: 82 82 82 88   Resp:       Temp:       TempSrc:       SpO2:  99% 99%    Weight:            Anesthesia Evaluation     Patient summary reviewed and Nursing notes reviewed    Airway   Mallampati: I  TM distance: >3 FB  Neck ROM: full  Dental - Dentition appears grossly intact     Pulmonary - negative ROS and normal exam   Cardiovascular - negative ROS and normal exam    Neuro/Psych - negative ROS     GI/Hepatic/Renal - negative ROS     Endo/Other - negative ROS   Abdominal  - normal exam               Anesthesia Plan:   ASA:  2  Plan:   Epidural  Informed Consent Plan and Risks Discussed With:  Patient    I have informed Shirley Jonesdivia Yonny and/or legal guardian or family member of the nature of the anesthetic plan, benefits, risks including possible dental damage if relevant, major complications, and any alternative forms of anesthetic management.   All of the patient's questions were answered to the best of my ability. The patient desires the anesthetic management as planned.  SAVANNA DUMONT MD  1/10/2025 5:58 PM  Present on Admission:  **None**           [1]   Medications Prior to Admission   Medication Sig Dispense Refill Last Dose/Taking   • magnesium oxide 400 MG Oral Tab Take 1 tablet (400 mg total) by mouth at bedtime. 30 tablet 1 1/9/2025   • famotidine 20 MG Oral Tab Take 2 tablets (40 mg total) by mouth at bedtime. 60 tablet 5 1/9/2025   • PRENATAL 27-0.8 MG Oral Tab TAKE 1 TABLET BY MOUTH DAILY 30 tablet 0 1/9/2025   • Ferrous Sulfate 325 (65 Fe) MG Oral Tab Take 1 tablet (325 mg total) by mouth every other day. 90 tablet 1 1/9/2025   • Aspirin 81 MG Oral Cap Take 2 tablets by mouth daily. 90 capsule 2 1/9/2025   •  Cholecalciferol (VITAMIN D) 50 MCG ( UT) Oral Tab Take 1 tablet by mouth daily. 90 tablet 1 2025   • diphenhydrAMINE 25 MG Oral Cap Take 1 capsule (25 mg total) by mouth every 6 (six) hours as needed for Sleep (Headache). 16 capsule 0    • [] sulfamethoxazole-trimethoprim DS (BACTRIM DS) 800-160 MG Oral Tab per tablet Take 1 tablet by mouth 2 (two) times daily for 7 days. 14 tablet 0    [2]   Current Facility-Administered Medications Ordered in Epic   Medication Dose Route Frequency Provider Last Rate Last Admin   • dextrose in lactated ringers 5% infusion   Intravenous Continuous Khloe Orr MD       • lactated ringers infusion   Intravenous PRN Khloe Orr MD       • lactated ringers IV bolus 500 mL  500 mL Intravenous PRN Khloe Orr MD       • acetaminophen (Tylenol Extra Strength) tab 500 mg  500 mg Oral Q6H PRN Khloe Orr MD       • acetaminophen (Tylenol Extra Strength) tab 1,000 mg  1,000 mg Oral Q6H PRN Khloe Orr MD       • ibuprofen (Motrin) tab 600 mg  600 mg Oral Once PRN Khloe Orr MD       • fentaNYL (Sublimaze) 50 mcg/mL injection 50 mcg  50 mcg Intravenous Q30 Min PRN Khloe Orr MD       • ondansetron (Zofran) 4 MG/2ML injection 4 mg  4 mg Intravenous Q6H PRN Khloe Orr MD       • calcium carbonate (Tums) chewable tab 1,000 mg  1,000 mg Oral Q4H PRN Khloe Orr MD       • diphenhydrAMINE (Benadryl) 50 mg/mL  injection 25 mg  25 mg Intravenous Nightly PRN Khloe Orr MD       • oxyTOCIN in sodium chloride 0.9% (Pitocin) 30 Units/500mL infusion premix  62.5-900 parrish-units/min Intravenous Continuous Khloe Orr MD       • terbutaline (Brethine) 1 MG/ML injection 0.25 mg  0.25 mg Subcutaneous PRN Khloe Orr MD       • sodium citrate-citric acid (Bicitra) 500-334 MG/5ML oral solution 30 mL  30 mL Oral PRN Khloe Orr MD       • nalbuphine (Nubain) 10 mg/mL injection 10 mg  10 mg Intramuscular  Q6H PRN Khloe Orr MD        And   • hydrOXYzine 50 mg/mL injection 50 mg  50 mg Intramuscular Q6H PRN Khloe Orr MD       • lidocaine PF (Xylocaine-MPF) 1% injection  30 mL Intradermal Once Khloe Orr MD       • misoprostol (CYTOTEC) partial tablets 25 mcg  25 mcg Vaginal 6 times per day Khloe Orr MD   25 mcg at 01/10/25 1235   • oxyTOCIN in sodium chloride 0.9% (Pitocin) 30 Units/500mL infusion premix  0.5-20 parrish-units/min Intravenous Continuous Khloe Orr MD       • lactated ringers IV bolus 1,000 mL  1,000 mL Intravenous Once Naresh Crawford MD       • fentaNYL-bupivacaine 2 mcg/mL-0.125% in sodium chloride 0.9% 100 mL EPIDURAL infusion premix   Epidural Continuous Naresh Crawford MD       • fentaNYL (Sublimaze) 50 mcg/mL injection 100 mcg  100 mcg Epidural Once Naresh Crawford MD       • bupivacaine PF (Marcaine) 0.25% injection  20 mL Epidural Once Naresh Crawford MD       • ePHEDrine (PF) 25 MG/5 ML injection 5 mg  5 mg Intravenous PRN Naresh Crawford MD       • nalbuphine (Nubain) 10 mg/mL injection 2.5 mg  2.5 mg Intravenous Q15 Min PRN Naresh Crawford MD         No current [facility]-ordered outpatient medications on file.   [3] No Known Allergies

## 2025-01-10 NOTE — PROGRESS NOTES
[facility]  part of [facility]    Labor Progress Note    Shirley Blancas Patient Status:  Inpatient    1990 MRN [id number]   Location [facility] Attending Khloe Orr MD   Hosp Day # 0 PCP No primary care provider on file.        Subjective   Interval History:   IOL for ama, post dates        Objective   Vital signs in last 24 hours:  Temp:  [97.9 °F (36.6 °C)-98.3 °F (36.8 °C)] 97.9 °F (36.6 °C)  Pulse:  [68-70] 68  Resp:  [16-18] 18  BP: (111-122)/(77-84) 111/84    Input/Output:  No intake or output data in the 24 hours ending 01/10/25 1108    Fetal Surveillance:  Fetal heart tones:Fetal heart variability: moderate  Fetal Heart Rate decelerations: none  Fetal Heart Rate accelerations: yes        Sterile vaginal exam:  Dilation: closed   Effacement:  70    Station: -2   Position: Cephalic        Results:     Lab Results   Component Value Date    TREPONEMALAB Nonreactive 01/10/2025    ABO O 01/10/2025    RH Positive 01/10/2025    WBC 10.5 01/10/2025    WBC 10.5 01/10/2025    HGB 14.1 01/10/2025    HGB 14.1 01/10/2025    HCT 40.5 01/10/2025    HCT 40.5 01/10/2025    .0 01/10/2025    .0 01/10/2025       Lab Results   Component Value Date    COLORUR Yellow 2024    CLARITY Ex.Turbid (A) 2024    SPECGRAVITY 1.020 2024    PROUR Negative 2024    GLUUR Normal 2024    KETUR Negative 2024    BILUR Negative 2024    BLOODURINE Negative 2024    NITRITE Positive (A) 2024    UROBILINOGEN Normal 2024    LEUUR Negative 2024           Assessment/Plan     Advanced maternal age in multigravida, third trimester (HCC)  Cont cytotec              Plan discussed with patient who verbalizes understanding and agreement.        Naresh Crawford MD  1/10/2025

## 2025-01-10 NOTE — PROGRESS NOTES
Pt is a 34 year old female admitted to LDR5/LDR5-A.     Chief Complaint   Patient presents with    Scheduled Induction     AMA      Pt is  40w1d intra-uterine pregnancy.  History obtained, consents signed. Oriented to room, staff, and plan of care.

## 2025-01-10 NOTE — NST
Nonstress Test   Patient: Shirley Blancas    Gestation: 40w0d    Diagnosis from order:      Problem List:   Patient Active Problem List   Diagnosis    Supervision of high-risk pregnancy of elderly primigravida (HCC)    Acute non intractable tension-type headache       NST:        2025   NST DOCUMENTATION   Variability 6-25 BPM   Accelerations Yes   Decelerations None   Baseline 145 BPM   Uterine Irritability No   Contractions Irregular   Acoustic Stimulator No   Nonstress Test Interpretation Reactive   Nonstress Test Second Interpretation Reactive   NST Completed by MANDA Dumont   Disposition Home    Testing Plan Weekly NST   Provider Notified MD Kirby         I agree with the above evaluation. NST completed.  Khloe Orr MD, MD  2025  7:11 PM

## 2025-01-10 NOTE — ANESTHESIA PROCEDURE NOTES
Labor Analgesia    Date/Time: 1/10/2025 5:48 PM    Performed by: David Higgins MD  Authorized by: David Higgins MD      General Information and Staff    Start Time:  1/10/2025 5:48 PM  End Time:  1/10/2025 5:59 PM  Anesthesiologist:  David Higgins MD  Performed by:  Anesthesiologist  Patient Location:  OB  Reason for Block: labor epidural    Preanesthetic Checklist: patient identified, IV checked, site marked, risks and benefits discussed, monitors and equipment checked, pre-op evaluation, timeout performed, anesthesia consent and sterile technique used      Procedure Details    Patient Position:  Sitting  Prep: ChloraPrep    Monitoring:  Heart rate  Approach:  Midline    Epidural Needle    Injection Technique:  ROBEL air  Needle Type:  Tuohy  Needle Gauge:  18 G  Needle Length:  3.5 in  Location:  L2-3    Spinal Needle      Catheter    Catheter Type:  Multi-orifice  Catheter Size:  20 G  Catheter at Skin Depth:  14  Test Dose:  Negative    Assessment    Sensory Level:  T6    Additional Comments     First pass motor intact

## 2025-01-11 LAB
BASOPHILS # BLD: 0 X10(3) UL (ref 0–0.2)
BASOPHILS NFR BLD: 0 %
DEPRECATED RDW RBC AUTO: 46.3 FL (ref 35.1–46.3)
EOSINOPHIL # BLD: 0 X10(3) UL (ref 0–0.7)
EOSINOPHIL NFR BLD: 0 %
ERYTHROCYTE [DISTWIDTH] IN BLOOD BY AUTOMATED COUNT: 13.6 % (ref 11–15)
HCT VFR BLD AUTO: 31.5 %
HGB BLD-MCNC: 10.7 G/DL
LYMPHOCYTES NFR BLD: 0.64 X10(3) UL (ref 1–4)
LYMPHOCYTES NFR BLD: 3 %
MCH RBC QN AUTO: 31.8 PG (ref 26–34)
MCHC RBC AUTO-ENTMCNC: 34 G/DL (ref 31–37)
MCV RBC AUTO: 93.8 FL
MONOCYTES # BLD: 0.21 X10(3) UL (ref 0.1–1)
MONOCYTES NFR BLD: 1 %
MORPHOLOGY: NORMAL
NEUTROPHILS # BLD AUTO: 20.05 X10 (3) UL (ref 1.5–7.7)
NEUTROPHILS NFR BLD: 82 %
NEUTS BAND NFR BLD: 14 %
NEUTS HYPERSEG # BLD: 20.45 X10(3) UL (ref 1.5–7.7)
PLATELET # BLD AUTO: 125 10(3)UL (ref 150–450)
PLATELET MORPHOLOGY: NORMAL
RBC # BLD AUTO: 3.36 X10(6)UL
TOTAL CELLS COUNTED BLD: 100
WBC # BLD AUTO: 21.3 X10(3) UL (ref 4–11)

## 2025-01-11 RX ORDER — HYDROCODONE BITARTRATE AND ACETAMINOPHEN 5; 325 MG/1; MG/1
1 TABLET ORAL EVERY 6 HOURS PRN
Status: DISCONTINUED | OUTPATIENT
Start: 2025-01-11 | End: 2025-01-13

## 2025-01-11 RX ORDER — BISACODYL 10 MG
10 SUPPOSITORY, RECTAL RECTAL ONCE AS NEEDED
Status: DISCONTINUED | OUTPATIENT
Start: 2025-01-11 | End: 2025-01-13

## 2025-01-11 RX ORDER — ONDANSETRON 2 MG/ML
4 INJECTION INTRAMUSCULAR; INTRAVENOUS EVERY 6 HOURS PRN
Status: DISCONTINUED | OUTPATIENT
Start: 2025-01-11 | End: 2025-01-13

## 2025-01-11 RX ORDER — AMMONIA 15 % (W/V)
0.3 AMPUL (EA) INHALATION AS NEEDED
Status: DISCONTINUED | OUTPATIENT
Start: 2025-01-11 | End: 2025-01-13

## 2025-01-11 RX ORDER — KETOROLAC TROMETHAMINE 30 MG/ML
30 INJECTION, SOLUTION INTRAMUSCULAR; INTRAVENOUS EVERY 6 HOURS
Status: COMPLETED | OUTPATIENT
Start: 2025-01-11 | End: 2025-01-11

## 2025-01-11 RX ORDER — GABAPENTIN 300 MG/1
300 CAPSULE ORAL EVERY 8 HOURS PRN
Status: DISCONTINUED | OUTPATIENT
Start: 2025-01-11 | End: 2025-01-13

## 2025-01-11 RX ORDER — ACETAMINOPHEN 500 MG
1000 TABLET ORAL EVERY 6 HOURS
Status: DISCONTINUED | OUTPATIENT
Start: 2025-01-11 | End: 2025-01-13

## 2025-01-11 RX ORDER — DOCUSATE SODIUM 100 MG/1
100 CAPSULE, LIQUID FILLED ORAL
Status: DISCONTINUED | OUTPATIENT
Start: 2025-01-11 | End: 2025-01-13

## 2025-01-11 RX ORDER — DEXTROSE, SODIUM CHLORIDE, SODIUM LACTATE, POTASSIUM CHLORIDE, AND CALCIUM CHLORIDE 5; .6; .31; .03; .02 G/100ML; G/100ML; G/100ML; G/100ML; G/100ML
INJECTION, SOLUTION INTRAVENOUS CONTINUOUS
Status: DISCONTINUED | OUTPATIENT
Start: 2025-01-11 | End: 2025-01-13

## 2025-01-11 RX ORDER — AMPICILLIN 2 G/1
INJECTION, POWDER, FOR SOLUTION INTRAVENOUS
Status: COMPLETED
Start: 2025-01-11 | End: 2025-01-11

## 2025-01-11 RX ORDER — CHOLECALCIFEROL (VITAMIN D3) 25 MCG
1 TABLET,CHEWABLE ORAL DAILY
Status: DISCONTINUED | OUTPATIENT
Start: 2025-01-11 | End: 2025-01-13

## 2025-01-11 RX ORDER — IBUPROFEN 600 MG/1
600 TABLET, FILM COATED ORAL EVERY 6 HOURS
Status: DISCONTINUED | OUTPATIENT
Start: 2025-01-11 | End: 2025-01-13

## 2025-01-11 NOTE — PROGRESS NOTES
[facility]  part of [facility]    Labor Progress Note    Shirley Funezth Ruy Blancas Patient Status:  Inpatient    1990 MRN [id number]   Location [facility] Attending Khloe Orr MD   Hosp Day # 0 PCP No primary care provider on file.        Subjective   Interval History:   Pt had a bradycardia.         Objective   Vital signs in last 24 hours:  Temp:  [97.9 °F (36.6 °C)-101 °F (38.3 °C)] 101 °F (38.3 °C)  Pulse:  [] 107  Resp:  [16-29] 22  BP: ()/() 97/56  SpO2:  [95 %-100 %] 100 %    Input/Output:    Intake/Output Summary (Last 24 hours) at 1/10/2025 2305  Last data filed at 1/10/2025 2233  Gross per 24 hour   Intake 1956.25 ml   Output 875 ml   Net 1081.25 ml       Fetal Surveillance:  Fetal heart tones:        Sterile vaginal exam:  Dilation:    Effacement:    Station:    Position:         Results:     Lab Results   Component Value Date    TREPONEMALAB Nonreactive 01/10/2025    ABO O 01/10/2025    RH Positive 01/10/2025    WBC 10.5 01/10/2025    WBC 10.5 01/10/2025    HGB 14.1 01/10/2025    HGB 14.1 01/10/2025    HCT 40.5 01/10/2025    HCT 40.5 01/10/2025    .0 01/10/2025    .0 01/10/2025       Lab Results   Component Value Date    COLORUR Yellow 2024    CLARITY Ex.Turbid (A) 2024    SPECGRAVITY 1.020 2024    PROUR Negative 2024    GLUUR Normal 2024    KETUR Negative 2024    BILUR Negative 2024    BLOODURINE Negative 2024    NITRITE Positive (A) 2024    UROBILINOGEN Normal 2024    LEUUR Negative 2024           Assessment/Plan     Advanced maternal age in multigravida, third trimester (HCC)  We had patient hand/knee position, left lateral and remained with fetal bradycardia. She was complete /+1 station. I pushed once and there was no descent. Because of persistant bradycardia I called emergent       Fetal heart rate non-reassuring  affecting management of mother (HCC)          I got verbal consent      Plan discussed with patient who verbalizes understanding and agreement.        Naresh Crawford MD  1/10/2025

## 2025-01-11 NOTE — PROGRESS NOTES
Received pt in . Pt transferred via cart. VS and assessment WNL. Oriented to room. POC reviewed. Instructed to call for assistance. Report received from L&D RN, Karen and Sally.

## 2025-01-11 NOTE — ANESTHESIA POST-OP FOLLOW-UP NOTE
ANESTHESIOLOGY FOLLOW-UP       PPD #1 s/p Epidural Anesthesia for       SUBJECTIVE:   Patient doing well.  Pain controlled adequately.   Denies back pain, headache, neurologic deficits, and b/b incontinence.      OBJECTIVE:  Patient is comfortable.     Side Effects: None    Vital signs:   /64 (BP Location: Right arm)   Pulse 75   Temp 98.1 °F (36.7 °C) (Oral)   Resp 16   Wt 82.6 kg (182 lb)   LMP 2024 (Exact Date)   SpO2 95%   Breastfeeding Yes   BMI 27.90 kg/m²           Recent Labs:  Recent Labs   Lab 25  0549   RBC 3.36*   HGB 10.7*   HCT 31.5*   MCV 93.8   MCH 31.8   MCHC 34.0   RDW 13.6   NEPRELIM 20.05*   WBC 21.3*   .0*         ASSESSMENT/PLAN:     Patient is 34 year old y/o female, post op day 1    Plan:Sign off the patient. Please re-consult us if our services are needed in the future.    If questions or concerns, please call #[phone]    Please call immediately for increased lower extremity weakness.      Katerine Baugh, DO  Anesthesiology  Pain Medicine

## 2025-01-11 NOTE — PROGRESS NOTES
Emergent section called able to dose epidural with 2 % lido epi with adequate block level obtained  prior to incision

## 2025-01-11 NOTE — DISCHARGE INSTRUCTIONS
[facility] has great support for our families even after discharge.  We have virtual or in-person support groups.  Visit our website for the most up-to-date info for our many different support groups. [email or web address]       Outpatient Lactation appointments:  Call [phone]- to schedule an appt.  Our office is located in the Maternal Fetal Medicine office next to [facility] on the first floor.        Support Groups: Moms-to-be are also welcome! All mom's welcome even if its not your first.     MOM & BABY HOUR- Every new mom can use some support in the exciting but challenging adjustment to motherhood. Join us for Mom and Baby Hour where an experienced nurse and lactation consultant will guide conversations and answer questions and provide breastfeeding support.  Most weeks we will also have a guest speaker to present information on many different parenting topics. We welcome mothers and their infants (up to crawling), dad, grandmas, and others to join our group.    Meets most  10:00 - 11:30 a.m.  Masks are not required, but be considerate of others and do not attend if mom or baby have had any symptoms of illness within the previous 24 hours. Location [facility] [place] [address] [place] Go inside the front door and to the right to the “Community Education Room”.      Nurturing Mom- A support group for new and expectant moms looking for support with the transition to parenthood as well as those experiencing symptoms of  anxiety and/or depression.  Please contact [email or web address] if you need directions or the link for the virtual meetings. Please contact [email or web address] if you plan to attend, but please be considerate of others and do not attend if mom or baby have had any symptoms of illness within the previous 24 hours.       Mom's Line: [phone]  A phone line dedicated for women (or anyone  worried about a women) who may be experiencing signs or symptoms of postpartum depression, anxiety, or overwhelmed with new baby. Call 24/7- answered by live trained mental health professionals, free and confidential, emotional support, referrals, in any language.    La Leche League for breast feeding and parent support, Website: IIIi.org  and for the [place] group and other groups visit [email or web address]/[email or web address]    Facebook groups-  for more support when home- Babies & Mommies [facility] --- you can find mom-to-mom advice and the list of speaker topics for cradle talk program.  Telephone Support  Postpartum depression [place] of [place] (www.ppdil.org)  -Free information and support for pregnant and postpartum women with symptoms of depression, anxiety  -Mom-to-mom support from volunteers who have been through depression    Telephone support: ([phone]  Urgent support:([phone])-[phone] (answered 24/7)  Email support: support@ppdil.org    Postpartum Support International (www.postpartum.net)  -Free phone conversation with trained volunteers   (433) 443-1732; after the prompt enter 64743#    National Postpartum Depression Hotline  (301)-PPD-MOMS    Helpful websites:    www.llli.org  www.[place]  www.Breastfeed[place].org    Initiation of breast pumping after discharge:     - Add 1 pumping session a day, additional to the infant's feedings, 2-3 weeks after delivery.     - Pump both breasts for 15 mins, immediately after a breast feeding session.    - Pumping first thing in the morning will provide greater output.    - If you chose to pump more than once a day, you should be consistent every day to prevent a breast infection.      - Pump using an electric pump over a hands free pump (electric pumps provided stronger stimulation to the nipples).    - Store the breast milk in 2-3 oz containers.     - Label containers with date and time.    - Always feed oldest milk first.

## 2025-01-11 NOTE — PROGRESS NOTES
Patient transferred to mother/baby room 366 per cart in stable condition with baby and personal belongings.  Accompanied by  and staff.  Report given to mother/baby MANDA Brandt.

## 2025-01-11 NOTE — ANESTHESIA POSTPROCEDURE EVALUATION
Patient: Shirley Blancas    Procedure Summary       Date: 01/10/25 Room / Location: [facility] L+D OR 02 / [facility] L+D OR    Anesthesia Start: 1748 Anesthesia Stop:     Procedure:  SECTION (Abdomen) Diagnosis: (fetal intolerance)    Surgeons: Naresh Crawford MD Anesthesiologist: Savanna Dumont MD    Anesthesia Type: epidural ASA Status: 2            Anesthesia Type: epidural    Vitals Value Taken Time   BP 93/57 01/10/25 2224   Temp 101 °F (38.3 °C) 01/10/25 2218   Pulse 115 01/10/25 2226   Resp 23 01/10/25 2226   SpO2 98 % 01/10/25 2226   Vitals shown include unfiled device data.    EM AN Post Evaluation:   Patient Evaluated in PACU  Patient Participation: complete - patient participated  Level of Consciousness: awake  Pain Score: 0  Pain Management: adequate  Airway Patency:patent  Dental exam unchanged from preop  Yes    Nausea/Vomiting: none  Cardiovascular Status: acceptable, hypotensive and tachycardic  Respiratory Status: acceptable  Postoperative Hydration acceptable  Comments: Pt hypotensive and tachycardic   Fluid bolus given 100 mcg of adan   Uterus firm no signs of bleeding   Will observe at bedside   Ob knows about pt      SAVANNA DUMONT MD  1/10/2025 10:27 PM

## 2025-01-11 NOTE — PROGRESS NOTES
[facility]  part of [facility]    OB/GYNE Progress Note      [place] Merry Ruy Blancas Patient Status:  Inpatient    1990 MRN [id number]   Location [facility] 3SE Attending Khloe Orr MD   Hosp Day # 1 PCP No primary care provider on file.          Subjective     No c/o    Review of Systems:  Constitutional: feeling well        Objective   Vital signs in last 24 hours:  Temp:  [97.6 °F (36.4 °C)-100.3 °F (37.9 °C)] 97.6 °F (36.4 °C)  Pulse:  [] 79  Resp:  [16-29] 16  BP: ()/() 108/70  SpO2:  [95 %-100 %] 95 %    Input/Output:    Intake/Output Summary (Last 24 hours) at 2025 1100  Last data filed at 2025 0912  Gross per 24 hour   Intake 1956.25 ml   Output 4707 ml   Net -2750.75 ml       Weight (Last 6):  Wt Readings from Last 6 Encounters:   01/10/25 182 lb (82.6 kg)   25 182 lb (82.6 kg)   24 177 lb (80.3 kg)   24 179 lb 6.4 oz (81.4 kg)   24 174 lb (78.9 kg)   24 172 lb (78 kg)     Body mass index is 27.9 kg/m².     Exam:   Constitutional: comfortable  Uterus: fundus firm and fundus below umbilicus  Wound/Incision:  clean, dry and intact  Extremities: No edema  Negative Rehan's sign.          Results:     Lab Results   Component Value Date    TREPONEMALAB Nonreactive 01/10/2025    ABO O 01/10/2025    RH Positive 01/10/2025    WBC 21.3 (H) 2025    HGB 10.7 (L) 2025    HCT 31.5 (L) 2025    .0 (L) 2025    CREATSERUM 0.94 01/10/2025    BUN 13 01/10/2025     01/10/2025    K 4.3 01/10/2025     01/10/2025    CO2 21.0 01/10/2025    GLU 79 01/10/2025    CA 7.9 (L) 01/10/2025    ALB 2.6 (L) 01/10/2025    ALKPHO 212 (H) 01/10/2025    BILT 0.8 01/10/2025    TP 4.8 (L) 01/10/2025    AST 14 01/10/2025    ALT <7 (L) 01/10/2025       Lab Results   Component Value Date    COLORUR Yellow 2024    CLARITY Ex.Turbid (A) 2024    SPECGRAVITY 1.020 2024    PROUR  Negative 2024    GLUUR Normal 2024    KETUR Negative 2024    BILUR Negative 2024    BLOODURINE Negative 2024    NITRITE Positive (A) 2024    UROBILINOGEN Normal 2024    LEUUR Negative 2024       No results found.      Assessment/Plan     Advanced maternal age in multigravida, third trimester ([facility])  Pod 1 emergent c/s      Fetal heart rate non-reassuring affecting management of mother ([facility])  Fever- amp and gent x 24 hrs      40 weeks gestation of pregnancy ([facility])        Term birth of  ([facility])                Discussed with: patient     Plan discussed with patient who verbalizes understanding and agreement.          Naresh Crawford MD  2025  11:00 AM

## 2025-01-11 NOTE — PROGRESS NOTES
[facility] Pharmacy Dosing Service     Initial Pharmacokinetic Consult for Aminoglycoside Dosing     Shirley Blancas is a 34 year old patient who is being initiated on aminoglycoside therapy for surgical prophylaxis. Pharmacy has been asked to dose gentamicin by Dr Crawfrod per P&T Protocol. The dosing approach will be prophylaxis, and the initial strategy will be extended interval dosing.    Vitals:    Actual Body Weight:   Wt Readings from Last 1 Encounters:   01/10/25 82.6 kg (182 lb)     Ideal body weight: 63.2 kg (139 lb 7 oz)  Adjusted ideal body weight: 71 kg (156 lb 7.4 oz)    Temp Readings from Last 1 Encounters:   01/10/25 99.6 °F (37.6 °C) (Oral)        Labs:   Recent Labs   Lab 01/10/25  2300   CREATSERUM 0.94      Estimated Creatinine Clearance: 83.3 mL/min (based on SCr of 0.94 mg/dL).   Recent Labs   Lab 01/10/25  0500 01/10/25  2300   WBC 10.5  10.5 4.6        Renal Dosing Considerations:    None     Assessment/Plan:   Empiric dosing weight: adjusted body weight (AdjBW)    Initial dose: 360 mg (5 mg/kg, capped at 520 mg) IV x 1 dose    Monitorin) Plan for aminoglycoside   NA.    2) Goal levels are as follows:  NA    3) Pharmacy will order SCr as clinically indicated to assess renal function.    4) Pharmacy will monitor for toxicity and efficacy, adjust aminoglycoside dose and/or frequency, and order levels as appropriate per the Pharmacy and Therapeutics Committee approved protocol until discontinuation of the medication.       We appreciate the opportunity to assist in the care of this patient     Gary Hayes, PharmD  2025  12:25 AM  [place]  Pharmacy Extension: [phone]

## 2025-01-11 NOTE — OPERATIVE REPORT
[facility]  part of [facility]    Post-Operative Note    Shirley Blancas Patient Status:  Inpatient    1990 MRN [id number]   Location [facility] Attending Khloe Orr MD   Hosp Day # 0 PCP No primary care provider on file.       Preoperative Diagnosis: abnormality fetal hear tones, fetal bradycardia  Postoperative Diagnosis: * same*  Procedure: primary  section, low transverse incision  pfannensteil  Primary surgeon:   Surgeon(s):    Naresh Crawford MD     Assistant: Dasha Correia MD    Surgical Findings: normal uterus, ovaries , tubes bilaterally  Anesthesia:Spinal   Complications: None; patient tolerated the procedure well. Hemabate and txa given  Specimen: * No order type specified *  Condition: . doing well without problems  Estimated blood loss:  pending  Quantitative Blood Loss (mL)      Comment:     The patient was prepped with betadine splash and draped in the usual sterile fashion. A time out was performed and scd’s were placed to decrease her risk for DVT and a dose of antibiotics was given preoperatively to decrease her risk for infection. After adequate level of anesthesia was ascertained, a  Pfannenstiel incision was lundberg  with the scalpel and the incision was extended down to the level of the fascia. The fascia was incised and extended bilaterally manually.  The rectus muscles were  superiorly and inferiorly manually.  The peritoneal cavity was entered manually and  extended superiorly and extended inferiorly.  A bladder blade was place.  A low transverse incision was created and amniotomy was performed. The amniotic fluid was clear. The infant was bulb suctioned.  The remainder of the body was delivered without complication.  The cord was clamped and cut.  Cord blood and cord gases were obtained.  The infant was shown to the parents and placed in the radiant warmer.  The placenta was delivered  manually and the uterus was cleared of all clots and debris with a wet lap sponge.  The uterus was delivered through the incision and the corners grasped with ring forceps. Uterus, tubes and ovaries were normal in appearance. The cervix was dilated with a ring forcep which was then passed off of the field.  The bladder blade was replaced.  The hysterotomy was closed using running, locking 0-Vicryl suture. The incision was inspected and good hemostasis noted.   The cul de sac was inspected and there was no evidence of trauma. The uterus was replaced into the abdominal cavity and the gutters irrigated with normal saline.  Re-inspection of the hysterotomy, peritomeum and rectus muscles noted them to be entirely hemostatic.  The peritoneum was closed with a running 2-0 Vicryl.  The fascia was closed with 2 separate running O-Vicryl sutures, each tied individually in the midline.  The subcutaneous tissue was copiously irrigated and any bleeding cauterized.  The subcutaneous tissue was closed using 1 layer of 2-0 plain. The skin was closed using a running 4-0 monocryl and Adson’s.  The surgical assistant was present throughout the entire case and was critical in ensuring adequate exposure for the patient's safety and optimization of outcomes.  The assistant actively assisted in retraction, exposure, hemostasis, entry/closure as well as other essential operative technical functions.  The sponge and instrument counts were reported to me as being correct.  The patient tolerated the procedure and was stable to the recovery room.      Naresh Crawford MD   1/10/2025  11:07 PM

## 2025-01-12 RX ORDER — BUTALBITAL, ACETAMINOPHEN AND CAFFEINE 50; 325; 40 MG/1; MG/1; MG/1
1 TABLET ORAL EVERY 4 HOURS PRN
Status: DISCONTINUED | OUTPATIENT
Start: 2025-01-12 | End: 2025-01-13

## 2025-01-12 RX ORDER — DIPHENHYDRAMINE HCL 25 MG
25 CAPSULE ORAL EVERY 6 HOURS PRN
Status: DISCONTINUED | OUTPATIENT
Start: 2025-01-12 | End: 2025-01-13

## 2025-01-12 NOTE — PLAN OF CARE
Problem: ANXIETY  Goal: Will report anxiety at manageable levels  Description: INTERVENTIONS:  - Administer medication as ordered  - Teach and rehearse alternative coping skills  - Provide emotional support with 1:1 interaction with staff  Outcome: Progressing     Problem: GASTROINTESTINAL - ADULT  Goal: Minimal or absence of nausea and vomiting  Description: INTERVENTIONS:  - Maintain adequate hydration with IV or PO as ordered and tolerated  - Nasogastric tube to low intermittent suction as ordered  - Evaluate effectiveness of ordered antiemetic medications  - Provide nonpharmacologic comfort measures as appropriate  - Advance diet as tolerated, if ordered  - Obtain nutritional consult as needed  - Evaluate fluid balance  Outcome: Progressing

## 2025-01-12 NOTE — PLAN OF CARE
Problem: Patient Centered Care  Goal: Patient preferences are identified and integrated in the patient's plan of care  Description: Interventions:  - What would you like us to know as we care for you? Patient is having a baby boy, plans to breast feed and would like an epidural for pain management  - Provide timely, complete, and accurate information to patient/family  - Incorporate patient and family knowledge, values, beliefs, and cultural backgrounds into the planning and delivery of care  - Encourage patient/family to participate in care and decision-making at the level they choose  - Honor patient and family perspectives and choices  Outcome: Progressing     Problem: Patient/Family Goals  Goal: Patient/Family Long Term Goal  Description: Patient's Long Term Goal: Uncomplicated Delivery     Interventions:  - Assessment/Monitoring  - Induction/Augmentation per protocol and Provider order  - C/S per protocol and Provider order   - Education  - Intervention per protocol and Provider order with education   - Involve patient in POC  - See additional Care Plan goals for specific interventions     Outcome: Progressing  Goal: Patient/Family Short Term Goal  Description: Patient's Short Term Goal: Comfort and Pain Control     Interventions:   - Non Pharmacological pain intervention   - IV/IM and Epidural pain medication per Provider order and patient request  - Education  - Involve Patient in POC   - See additional Care Plan goals for specific interventions       Outcome: Progressing     Problem: BIRTH - VAGINAL/ SECTION  Goal: Fetal and maternal status remain reassuring during the birth process  Description: INTERVENTIONS:  - Monitor vital signs  - Monitor fetal heart rate  - Monitor uterine activity  - Monitor labor progression (vaginal delivery)  - DVT prophylaxis (C/S delivery)  - Surgical antibiotic prophylaxis (C/S delivery)  Outcome: Progressing     Problem: PAIN - ADULT  Goal: Verbalizes/displays  adequate comfort level or patient's stated pain goal  Description: INTERVENTIONS:  - Encourage pt to monitor pain and request assistance  - Assess pain using appropriate pain scale  - Administer analgesics based on type and severity of pain and evaluate response  - Implement non-pharmacological measures as appropriate and evaluate response  - Consider cultural and social influences on pain and pain management  - Manage/alleviate anxiety  - Utilize distraction and/or relaxation techniques  - Monitor for opioid side effects  - Notify MD/LIP if interventions unsuccessful or patient reports new pain  - Anticipate increased pain with activity and pre-medicate as appropriate  Outcome: Progressing     Problem: ANXIETY  Goal: Will report anxiety at manageable levels  Description: INTERVENTIONS:  - Administer medication as ordered  - Teach and rehearse alternative coping skills  - Provide emotional support with 1:1 interaction with staff  Outcome: Progressing     Problem: GASTROINTESTINAL - ADULT  Goal: Minimal or absence of nausea and vomiting  Description: INTERVENTIONS:  - Maintain adequate hydration with IV or PO as ordered and tolerated  - Nasogastric tube to low intermittent suction as ordered  - Evaluate effectiveness of ordered antiemetic medications  - Provide nonpharmacologic comfort measures as appropriate  - Advance diet as tolerated, if ordered  - Obtain nutritional consult as needed  - Evaluate fluid balance  Outcome: Progressing  Goal: Maintains or returns to baseline bowel function  Description: INTERVENTIONS:  - Assess bowel function  - Maintain adequate hydration with IV or PO as ordered and tolerated  - Evaluate effectiveness of GI medications  - Encourage mobilization and activity  - Obtain nutritional consult as needed  - Establish a toileting routine/schedule  - Consider collaborating with pharmacy to review patient's medication profile  Outcome: Progressing  Goal: Maintains adequate nutritional intake  (undernourished)  Description: INTERVENTIONS:  - Monitor percentage of each meal consumed  - Identify factors contributing to decreased intake, treat as appropriate  - Assist with meals as needed  - Monitor I&O, WT and lab values  - Obtain nutritional consult as needed  - Optimize oral hygiene and moisture  - Encourage food from home; allow for food preferences  - Enhance eating environment  Outcome: Progressing  Goal: Achieves appropriate nutritional intake (bariatric)  Description: INTERVENTIONS:  - Monitor for over-consumption  - Identify factors contributing to increased intake, treat as appropriate  - Monitor I&O, WT and lab values  - Obtain nutritional consult as needed  - Evaluate psychosocial factors contributing to over-consumption  Outcome: Progressing     Problem: GENITOURINARY - ADULT  Goal: Absence of urinary retention  Description: INTERVENTIONS:  - Assess patient’s ability to void and empty bladder  - Monitor intake/output and perform bladder scan as needed  - Follow urinary retention protocol/standard of care  - Consider collaborating with pharmacy to review patient's medication profile  - Implement strategies to promote bladder emptying  Outcome: Progressing     Problem: POSTPARTUM  Goal: Long Term Goal:Experiences normal postpartum course  Description: INTERVENTIONS:  - Assess and monitor vital signs and lab values.  - Assess fundus and lochia.  - Provide ice/sitz baths for perineum discomfort.  - Monitor healing of incision/episiotomy/laceration, and assess for signs and symptoms of infection and hematoma.  - Assess bladder function and monitor for bladder distention.  - Provide/instruct/assist with pericare as needed.  - Provide VTE prophylaxis as needed.  - Monitor bowel function.  - Encourage ambulation and provide assistance as needed.  - Assess and monitor emotional status and provide social service/psych resources as needed.  - Utilize standard precautions and use personal protective  equipment as indicated. Ensure aseptic care of all intravenous lines and invasive tubes/drains.  - Obtain immunization and exposure to communicable diseases history.  Outcome: Progressing  Goal: Optimize infant feeding at the breast  Description: INTERVENTIONS:  - Initiate breast feeding within first hour after birth.   - Monitor effectiveness of current breast feeding efforts.  - Assess support systems available to mother/family.  - Identify cultural beliefs/practices regarding lactation, letdown techniques, maternal food preferences.  - Assess mother's knowledge and previous experience with breast feeding.  - Provide information as needed about early infant feeding cues (e.g., rooting, lip smacking, sucking fingers/hand) versus late cue of crying.  - Discuss/demonstrate breast feeding aids (e.g., infant sling, nursing footstool/pillows, and breast pumps).  - Encourage mother/other family members to express feelings/concerns, and actively listen.  - Educate father/SO about benefits of breast feeding and how to manage common lactation challenges.  - Recommend avoidance of specific medications or substances incompatible with breast feeding.  - Assess and monitor for signs of nipple pain/trauma.  - Instruct and provide assistance with proper latch.  - Review techniques for milk expression (breast pumping) and storage of breast milk. Provide pumping equipment/supplies, instructions and assistance, as needed.  - Encourage rooming-in and breast feeding on demand.  - Encourage skin-to-skin contact.  - Provide LC support as needed.  - Assess for and manage engorgement.  - Provide breast feeding education handouts and information on community breast feeding support.   Outcome: Progressing  Goal: Establishment of adequate milk supply with medication/procedure interruptions  Description: INTERVENTIONS:  - Review techniques for milk expression (breast pumping).   - Provide pumping equipment/supplies, instructions, and  assistance until it is safe to breastfeed infant.  Outcome: Progressing  Goal: Experiences normal breast weaning course  Description: INTERVENTIONS:  - Assess for and manage engorgement.  - Instruct on breast care.  - Provide comfort measures.  Outcome: Progressing  Goal: Appropriate maternal -  bonding  Description: INTERVENTIONS:  - Assess caregiver- interactions.  - Assess caregiver's emotional status and coping mechanisms.  - Encourage caregiver to participate in  daily care.  - Assess support systems available to mother/family.  - Provide /case management support as needed.  Outcome: Progressing

## 2025-01-12 NOTE — PROGRESS NOTES
[facility]  part of [facility]    OB/GYNE Progress Note      [place] Merry Ruy Blancas Patient Status:  Inpatient    1990 MRN [id number]   Location [facility] 3SE Attending Khloe Orr MD   Hosp Day # 2 PCP No primary care provider on file.          Subjective     No c/o    Review of Systems:  Constitutional: feeling well        Objective   Vital signs in last 24 hours:  Temp:  [97.9 °F (36.6 °C)-98.4 °F (36.9 °C)] 97.9 °F (36.6 °C)  Pulse:  [67-93] 67  Resp:  [16-18] 16  BP: (101-114)/(57-70) 101/70  SpO2:  [94 %-95 %] 95 %    Input/Output:    Intake/Output Summary (Last 24 hours) at 2025 1157  Last data filed at 2025 0050  Gross per 24 hour   Intake --   Output 2050 ml   Net -2050 ml       Weight (Last 6):  Wt Readings from Last 6 Encounters:   01/10/25 182 lb (82.6 kg)   25 182 lb (82.6 kg)   24 177 lb (80.3 kg)   24 179 lb 6.4 oz (81.4 kg)   24 174 lb (78.9 kg)   24 172 lb (78 kg)     Body mass index is 27.9 kg/m².     Exam:   Constitutional: comfortable  Uterus: fundus firm and fundus below umbilicus  Wound/Incision:  clean, dry and intact  Extremities: No edema  Negative Rehan's sign.          Results:     Lab Results   Component Value Date    TREPONEMALAB Nonreactive 01/10/2025    ABO O 01/10/2025    RH Positive 01/10/2025    WBC 21.3 (H) 2025    HGB 10.7 (L) 2025    HCT 31.5 (L) 2025    .0 (L) 2025    CREATSERUM 0.94 01/10/2025    BUN 13 01/10/2025     01/10/2025    K 4.3 01/10/2025     01/10/2025    CO2 21.0 01/10/2025    GLU 79 01/10/2025    CA 7.9 (L) 01/10/2025    ALB 2.6 (L) 01/10/2025    ALKPHO 212 (H) 01/10/2025    BILT 0.8 01/10/2025    TP 4.8 (L) 01/10/2025    AST 14 01/10/2025    ALT <7 (L) 01/10/2025       Lab Results   Component Value Date    COLORUR Yellow 2024    CLARITY Ex.Turbid (A) 2024    SPECGRAVITY 1.020 2024    PROUR Negative 2024     GLUUR Normal 2024    KETUR Negative 2024    BILUR Negative 2024    BLOODURINE Negative 2024    NITRITE Positive (A) 2024    UROBILINOGEN Normal 2024    LEUUR Negative 2024       No results found.      Assessment/Plan     Advanced maternal age in multigravida, third trimester (HCC)  cpm      Fetal heart rate non-reassuring affecting management of mother ([facility])        40 weeks gestation of pregnancy ([facility])        Term birth of  ([facility])                Discussed with: patient     Plan discussed with patient who verbalizes understanding and agreement.          Naresh Crawford MD  2025  11:57 AM

## 2025-01-13 VITALS
TEMPERATURE: 98 F | HEART RATE: 68 BPM | OXYGEN SATURATION: 95 % | DIASTOLIC BLOOD PRESSURE: 84 MMHG | BODY MASS INDEX: 28 KG/M2 | WEIGHT: 182 LBS | RESPIRATION RATE: 16 BRPM | SYSTOLIC BLOOD PRESSURE: 116 MMHG

## 2025-01-13 RX ORDER — GABAPENTIN 300 MG/1
300 CAPSULE ORAL EVERY 8 HOURS PRN
Qty: 20 CAPSULE | Refills: 0 | Status: SHIPPED | OUTPATIENT
Start: 2025-01-13 | End: 2025-01-20

## 2025-01-13 RX ORDER — IBUPROFEN 600 MG/1
600 TABLET, FILM COATED ORAL EVERY 6 HOURS
Qty: 30 TABLET | Refills: 0 | Status: SHIPPED | COMMUNITY
Start: 2025-01-13 | End: 2025-01-21

## 2025-01-13 RX ORDER — ACETAMINOPHEN 500 MG
1000 TABLET ORAL EVERY 6 HOURS
Qty: 30 TABLET | Refills: 0 | Status: SHIPPED | COMMUNITY
Start: 2025-01-13 | End: 2025-01-21

## 2025-01-13 RX ORDER — OXYCODONE HYDROCHLORIDE 5 MG/1
5 TABLET ORAL EVERY 6 HOURS PRN
Qty: 20 TABLET | Refills: 0 | Status: SHIPPED | OUTPATIENT
Start: 2025-01-13 | End: 2025-01-18

## 2025-01-13 NOTE — PLAN OF CARE
Problem: Patient Centered Care  Goal: Patient preferences are identified and integrated in the patient's plan of care  Description: Interventions:  - What would you like us to know as we care for you? Patient is having a baby boy, plans to breast feed and would like an epidural for pain management  - Provide timely, complete, and accurate information to patient/family  - Incorporate patient and family knowledge, values, beliefs, and cultural backgrounds into the planning and delivery of care  - Encourage patient/family to participate in care and decision-making at the level they choose  - Honor patient and family perspectives and choices  2025 by Katy Escamilla RN  Outcome: Completed  2025 by Katy Escamilla RN  Outcome: Progressing     Problem: Patient/Family Goals  Goal: Patient/Family Long Term Goal  Description: Patient's Long Term Goal: Uncomplicated Delivery     Interventions:  - Assessment/Monitoring  - Induction/Augmentation per protocol and Provider order  - C/S per protocol and Provider order   - Education  - Intervention per protocol and Provider order with education   - Involve patient in POC  - See additional Care Plan goals for specific interventions     2025 by Katy Escamilla RN  Outcome: Completed  2025 by Katy Escamilla RN  Outcome: Progressing  Goal: Patient/Family Short Term Goal  Description: Patient's Short Term Goal: Comfort and Pain Control     Interventions:   - Non Pharmacological pain intervention   - IV/IM and Epidural pain medication per Provider order and patient request  - Education  - Involve Patient in POC   - See additional Care Plan goals for specific interventions       2025 by Katy Escamilla RN  Outcome: Completed  2025 by Katy Escamilla RN  Outcome: Progressing     Problem: BIRTH - VAGINAL/ SECTION  Goal: Fetal and maternal status remain reassuring during the birth process  Description:  INTERVENTIONS:  - Monitor vital signs  - Monitor fetal heart rate  - Monitor uterine activity  - Monitor labor progression (vaginal delivery)  - DVT prophylaxis (C/S delivery)  - Surgical antibiotic prophylaxis (C/S delivery)  1/13/2025 1330 by Katy Escamilla RN  Outcome: Completed  1/13/2025 0855 by Katy Escamilla RN  Outcome: Progressing     Problem: PAIN - ADULT  Goal: Verbalizes/displays adequate comfort level or patient's stated pain goal  Description: INTERVENTIONS:  - Encourage pt to monitor pain and request assistance  - Assess pain using appropriate pain scale  - Administer analgesics based on type and severity of pain and evaluate response  - Implement non-pharmacological measures as appropriate and evaluate response  - Consider cultural and social influences on pain and pain management  - Manage/alleviate anxiety  - Utilize distraction and/or relaxation techniques  - Monitor for opioid side effects  - Notify MD/LIP if interventions unsuccessful or patient reports new pain  - Anticipate increased pain with activity and pre-medicate as appropriate  1/13/2025 1330 by Katy Escamilla RN  Outcome: Completed  1/13/2025 0855 by Katy Escamilla RN  Outcome: Progressing     Problem: ANXIETY  Goal: Will report anxiety at manageable levels  Description: INTERVENTIONS:  - Administer medication as ordered  - Teach and rehearse alternative coping skills  - Provide emotional support with 1:1 interaction with staff  1/13/2025 1330 by Katy Escamilla RN  Outcome: Completed  1/13/2025 0855 by Katy Escamilla RN  Outcome: Progressing     Problem: GASTROINTESTINAL - ADULT  Goal: Minimal or absence of nausea and vomiting  Description: INTERVENTIONS:  - Maintain adequate hydration with IV or PO as ordered and tolerated  - Nasogastric tube to low intermittent suction as ordered  - Evaluate effectiveness of ordered antiemetic medications  - Provide nonpharmacologic comfort measures as appropriate  - Advance diet as  tolerated, if ordered  - Obtain nutritional consult as needed  - Evaluate fluid balance  1/13/2025 1330 by Katy Escamilla RN  Outcome: Completed  1/13/2025 0855 by Katy Escamilla RN  Outcome: Progressing  Goal: Maintains or returns to baseline bowel function  Description: INTERVENTIONS:  - Assess bowel function  - Maintain adequate hydration with IV or PO as ordered and tolerated  - Evaluate effectiveness of GI medications  - Encourage mobilization and activity  - Obtain nutritional consult as needed  - Establish a toileting routine/schedule  - Consider collaborating with pharmacy to review patient's medication profile  1/13/2025 1330 by Katy Escamilla RN  Outcome: Completed  1/13/2025 0855 by Katy Escamilla RN  Outcome: Progressing  Goal: Maintains adequate nutritional intake (undernourished)  Description: INTERVENTIONS:  - Monitor percentage of each meal consumed  - Identify factors contributing to decreased intake, treat as appropriate  - Assist with meals as needed  - Monitor I&O, WT and lab values  - Obtain nutritional consult as needed  - Optimize oral hygiene and moisture  - Encourage food from home; allow for food preferences  - Enhance eating environment  1/13/2025 1330 by Katy Escamilla RN  Outcome: Completed  1/13/2025 0855 by Katy Escamilla RN  Outcome: Progressing  Goal: Achieves appropriate nutritional intake (bariatric)  Description: INTERVENTIONS:  - Monitor for over-consumption  - Identify factors contributing to increased intake, treat as appropriate  - Monitor I&O, WT and lab values  - Obtain nutritional consult as needed  - Evaluate psychosocial factors contributing to over-consumption  1/13/2025 1330 by Katy Escamilla RN  Outcome: Completed  1/13/2025 0855 by Katy Escamilla RN  Outcome: Progressing     Problem: GENITOURINARY - ADULT  Goal: Absence of urinary retention  Description: INTERVENTIONS:  - Assess patient’s ability to void and empty bladder  - Monitor intake/output and  perform bladder scan as needed  - Follow urinary retention protocol/standard of care  - Consider collaborating with pharmacy to review patient's medication profile  - Implement strategies to promote bladder emptying  1/13/2025 1330 by Katy Escamilla RN  Outcome: Completed  1/13/2025 0855 by Katy Escamilla RN  Outcome: Progressing     Problem: POSTPARTUM  Goal: Long Term Goal:Experiences normal postpartum course  Description: INTERVENTIONS:  - Assess and monitor vital signs and lab values.  - Assess fundus and lochia.  - Provide ice/sitz baths for perineum discomfort.  - Monitor healing of incision/episiotomy/laceration, and assess for signs and symptoms of infection and hematoma.  - Assess bladder function and monitor for bladder distention.  - Provide/instruct/assist with pericare as needed.  - Provide VTE prophylaxis as needed.  - Monitor bowel function.  - Encourage ambulation and provide assistance as needed.  - Assess and monitor emotional status and provide social service/psych resources as needed.  - Utilize standard precautions and use personal protective equipment as indicated. Ensure aseptic care of all intravenous lines and invasive tubes/drains.  - Obtain immunization and exposure to communicable diseases history.  1/13/2025 1330 by Katy Escamilla RN  Outcome: Completed  1/13/2025 0855 by Katy Escamilla RN  Outcome: Progressing  Goal: Optimize infant feeding at the breast  Description: INTERVENTIONS:  - Initiate breast feeding within first hour after birth.   - Monitor effectiveness of current breast feeding efforts.  - Assess support systems available to mother/family.  - Identify cultural beliefs/practices regarding lactation, letdown techniques, maternal food preferences.  - Assess mother's knowledge and previous experience with breast feeding.  - Provide information as needed about early infant feeding cues (e.g., rooting, lip smacking, sucking fingers/hand) versus late cue of crying.  -  Discuss/demonstrate breast feeding aids (e.g., infant sling, nursing footstool/pillows, and breast pumps).  - Encourage mother/other family members to express feelings/concerns, and actively listen.  - Educate father/SO about benefits of breast feeding and how to manage common lactation challenges.  - Recommend avoidance of specific medications or substances incompatible with breast feeding.  - Assess and monitor for signs of nipple pain/trauma.  - Instruct and provide assistance with proper latch.  - Review techniques for milk expression (breast pumping) and storage of breast milk. Provide pumping equipment/supplies, instructions and assistance, as needed.  - Encourage rooming-in and breast feeding on demand.  - Encourage skin-to-skin contact.  - Provide LC support as needed.  - Assess for and manage engorgement.  - Provide breast feeding education handouts and information on community breast feeding support.   2025 by Katy Escamilla RN  Outcome: Completed  2025 by Katy Escamilla RN  Outcome: Progressing  Goal: Establishment of adequate milk supply with medication/procedure interruptions  Description: INTERVENTIONS:  - Review techniques for milk expression (breast pumping).   - Provide pumping equipment/supplies, instructions, and assistance until it is safe to breastfeed infant.  2025 by Katy Escamilla RN  Outcome: Completed  2025 by Katy Escamilla RN  Outcome: Progressing  Goal: Experiences normal breast weaning course  Description: INTERVENTIONS:  - Assess for and manage engorgement.  - Instruct on breast care.  - Provide comfort measures.  2025 by Katy Escamilla RN  Outcome: Completed  2025 by Katy Escamilla RN  Outcome: Progressing  Goal: Appropriate maternal -  bonding  Description: INTERVENTIONS:  - Assess caregiver- interactions.  - Assess caregiver's emotional status and coping mechanisms.  - Encourage caregiver to  participate in  daily care.  - Assess support systems available to mother/family.  - Provide /case management support as needed.  2025 1330 by Katy Escamilla, RN  Outcome: Completed  2025 0855 by Katy Escamilla, RN  Outcome: Progressing

## 2025-01-13 NOTE — PLAN OF CARE
Problem: Patient Centered Care  Goal: Patient preferences are identified and integrated in the patient's plan of care  Description: Interventions:  - What would you like us to know as we care for you? Patient is having a baby boy, plans to breast feed and would like an epidural for pain management  - Provide timely, complete, and accurate information to patient/family  - Incorporate patient and family knowledge, values, beliefs, and cultural backgrounds into the planning and delivery of care  - Encourage patient/family to participate in care and decision-making at the level they choose  - Honor patient and family perspectives and choices  2025 by Zohra Roberson RN  Outcome: Progressing  2025 by Zhora Roberson RN  Outcome: Progressing     Problem: Patient/Family Goals  Goal: Patient/Family Long Term Goal  Description: Patient's Long Term Goal: Uncomplicated Delivery     Interventions:  - Assessment/Monitoring  - Induction/Augmentation per protocol and Provider order  - C/S per protocol and Provider order   - Education  - Intervention per protocol and Provider order with education   - Involve patient in POC  - See additional Care Plan goals for specific interventions     2025 by Zohra Roberson RN  Outcome: Progressing  2025 by Zohra Roberson RN  Outcome: Progressing  Goal: Patient/Family Short Term Goal  Description: Patient's Short Term Goal: Comfort and Pain Control     Interventions:   - Non Pharmacological pain intervention   - IV/IM and Epidural pain medication per Provider order and patient request  - Education  - Involve Patient in POC   - See additional Care Plan goals for specific interventions       2025 by Zohra Roberson RN  Outcome: Progressing  2025 by Zohra Roberson RN  Outcome: Progressing     Problem: BIRTH - VAGINAL/ SECTION  Goal: Fetal and maternal status remain reassuring during the birth process  Description:  INTERVENTIONS:  - Monitor vital signs  - Monitor fetal heart rate  - Monitor uterine activity  - Monitor labor progression (vaginal delivery)  - DVT prophylaxis (C/S delivery)  - Surgical antibiotic prophylaxis (C/S delivery)  1/13/2025 0059 by Zohra Roberson RN  Outcome: Progressing  1/13/2025 0059 by Zohra Roberson RN  Outcome: Progressing     Problem: PAIN - ADULT  Goal: Verbalizes/displays adequate comfort level or patient's stated pain goal  Description: INTERVENTIONS:  - Encourage pt to monitor pain and request assistance  - Assess pain using appropriate pain scale  - Administer analgesics based on type and severity of pain and evaluate response  - Implement non-pharmacological measures as appropriate and evaluate response  - Consider cultural and social influences on pain and pain management  - Manage/alleviate anxiety  - Utilize distraction and/or relaxation techniques  - Monitor for opioid side effects  - Notify MD/LIP if interventions unsuccessful or patient reports new pain  - Anticipate increased pain with activity and pre-medicate as appropriate  1/13/2025 0059 by Zohra Roberson RN  Outcome: Progressing  1/13/2025 0059 by Zohra Roberson RN  Outcome: Progressing     Problem: ANXIETY  Goal: Will report anxiety at manageable levels  Description: INTERVENTIONS:  - Administer medication as ordered  - Teach and rehearse alternative coping skills  - Provide emotional support with 1:1 interaction with staff  1/13/2025 0059 by Zohra Roberson RN  Outcome: Progressing  1/13/2025 0059 by Zohra Roberson RN  Outcome: Progressing     Problem: GASTROINTESTINAL - ADULT  Goal: Minimal or absence of nausea and vomiting  Description: INTERVENTIONS:  - Maintain adequate hydration with IV or PO as ordered and tolerated  - Nasogastric tube to low intermittent suction as ordered  - Evaluate effectiveness of ordered antiemetic medications  - Provide nonpharmacologic comfort measures as appropriate  - Advance diet as  tolerated, if ordered  - Obtain nutritional consult as needed  - Evaluate fluid balance  1/13/2025 0059 by Zohra Roberson RN  Outcome: Progressing  1/13/2025 0059 by Zohra Roberson RN  Outcome: Progressing  Goal: Maintains or returns to baseline bowel function  Description: INTERVENTIONS:  - Assess bowel function  - Maintain adequate hydration with IV or PO as ordered and tolerated  - Evaluate effectiveness of GI medications  - Encourage mobilization and activity  - Obtain nutritional consult as needed  - Establish a toileting routine/schedule  - Consider collaborating with pharmacy to review patient's medication profile  1/13/2025 0059 by Zohra Roberson RN  Outcome: Progressing  1/13/2025 0059 by Zohra Roberson RN  Outcome: Progressing  Goal: Maintains adequate nutritional intake (undernourished)  Description: INTERVENTIONS:  - Monitor percentage of each meal consumed  - Identify factors contributing to decreased intake, treat as appropriate  - Assist with meals as needed  - Monitor I&O, WT and lab values  - Obtain nutritional consult as needed  - Optimize oral hygiene and moisture  - Encourage food from home; allow for food preferences  - Enhance eating environment  1/13/2025 0059 by Zohra Roberson RN  Outcome: Progressing  1/13/2025 0059 by Zohra Roberson RN  Outcome: Progressing  Goal: Achieves appropriate nutritional intake (bariatric)  Description: INTERVENTIONS:  - Monitor for over-consumption  - Identify factors contributing to increased intake, treat as appropriate  - Monitor I&O, WT and lab values  - Obtain nutritional consult as needed  - Evaluate psychosocial factors contributing to over-consumption  1/13/2025 0059 by Zohra Roberson RN  Outcome: Progressing  1/13/2025 0059 by Zohra Roberson RN  Outcome: Progressing     Problem: GENITOURINARY - ADULT  Goal: Absence of urinary retention  Description: INTERVENTIONS:  - Assess patient’s ability to void and empty bladder  - Monitor intake/output and  perform bladder scan as needed  - Follow urinary retention protocol/standard of care  - Consider collaborating with pharmacy to review patient's medication profile  - Implement strategies to promote bladder emptying  1/13/2025 0059 by Zohra Roberson RN  Outcome: Progressing  1/13/2025 0059 by Zohra Roberson RN  Outcome: Progressing     Problem: POSTPARTUM  Goal: Long Term Goal:Experiences normal postpartum course  Description: INTERVENTIONS:  - Assess and monitor vital signs and lab values.  - Assess fundus and lochia.  - Provide ice/sitz baths for perineum discomfort.  - Monitor healing of incision/episiotomy/laceration, and assess for signs and symptoms of infection and hematoma.  - Assess bladder function and monitor for bladder distention.  - Provide/instruct/assist with pericare as needed.  - Provide VTE prophylaxis as needed.  - Monitor bowel function.  - Encourage ambulation and provide assistance as needed.  - Assess and monitor emotional status and provide social service/psych resources as needed.  - Utilize standard precautions and use personal protective equipment as indicated. Ensure aseptic care of all intravenous lines and invasive tubes/drains.  - Obtain immunization and exposure to communicable diseases history.  1/13/2025 0059 by Zohra Roberson RN  Outcome: Progressing  1/13/2025 0059 by Zohra Roberson RN  Outcome: Progressing  Goal: Optimize infant feeding at the breast  Description: INTERVENTIONS:  - Initiate breast feeding within first hour after birth.   - Monitor effectiveness of current breast feeding efforts.  - Assess support systems available to mother/family.  - Identify cultural beliefs/practices regarding lactation, letdown techniques, maternal food preferences.  - Assess mother's knowledge and previous experience with breast feeding.  - Provide information as needed about early infant feeding cues (e.g., rooting, lip smacking, sucking fingers/hand) versus late cue of crying.  -  Discuss/demonstrate breast feeding aids (e.g., infant sling, nursing footstool/pillows, and breast pumps).  - Encourage mother/other family members to express feelings/concerns, and actively listen.  - Educate father/SO about benefits of breast feeding and how to manage common lactation challenges.  - Recommend avoidance of specific medications or substances incompatible with breast feeding.  - Assess and monitor for signs of nipple pain/trauma.  - Instruct and provide assistance with proper latch.  - Review techniques for milk expression (breast pumping) and storage of breast milk. Provide pumping equipment/supplies, instructions and assistance, as needed.  - Encourage rooming-in and breast feeding on demand.  - Encourage skin-to-skin contact.  - Provide LC support as needed.  - Assess for and manage engorgement.  - Provide breast feeding education handouts and information on community breast feeding support.   2025 by Zohra Roberson RN  Outcome: Progressing  2025 by Zohra Roberson RN  Outcome: Progressing  Goal: Establishment of adequate milk supply with medication/procedure interruptions  Description: INTERVENTIONS:  - Review techniques for milk expression (breast pumping).   - Provide pumping equipment/supplies, instructions, and assistance until it is safe to breastfeed infant.  2025 by Zohra Roberson RN  Outcome: Progressing  2025 by Zohra Roberson RN  Outcome: Progressing  Goal: Experiences normal breast weaning course  Description: INTERVENTIONS:  - Assess for and manage engorgement.  - Instruct on breast care.  - Provide comfort measures.  Outcome: Progressing  Goal: Appropriate maternal -  bonding  Description: INTERVENTIONS:  - Assess caregiver- interactions.  - Assess caregiver's emotional status and coping mechanisms.  - Encourage caregiver to participate in  daily care.  - Assess support systems available to mother/family.  - Provide  /case management support as needed.  1/13/2025 0059 by Zohra Roberson, RN  Outcome: Progressing  1/13/2025 0059 by Zohra Roberson, RN  Outcome: Progressing

## 2025-01-13 NOTE — DISCHARGE SUMMARY
DISCHARGE SUMMARY      Date of Admission:  1/10/2025  Date of Discharge: 2025          Admission Diagnoses:  pregnancy  Advanced maternal age in multigravida, third trimester ([facility])  Discharge Diagnoses:  Active Problems:    Advanced maternal age in multigravida, third trimester ([facility])    Fetal heart rate non-reassuring affecting management of mother ([facility])    40 weeks gestation of pregnancy ([facility])    Term birth of  ([facility])             Operations/Procedures this visit:   Section       Hospital Course:    The patient presented to L&D and underwent c/s - see op note for details.  She delivered a viable infant. Postoperatively the patient did very well.  Her hemoglobin stabilized.  By POD # 3 the patient was feeling well, tolerating a regular diet, passing flatus, and ambulating and voiding without difficulty.  Her vitals had remained stable & she was afebrile.  Her incision was clean, dry, & intact without evidence of infection or erythema.  The patient desired discharge on POD # 3.       Condition at Discharge:  /84 (BP Location: Right arm)   Pulse 68   Temp 97.7 °F (36.5 °C) (Oral)   Resp 16   Wt 182 lb (82.6 kg)   LMP 2024 (Exact Date)   SpO2 95%   Breastfeeding No   BMI 27.90 kg/m²     Physical Exam   Gen - NAD  Abdomen -soft, ND, NT  Fundus - firm, NT  Incision - C/D/I  Extremeties - NT    Discharge Disposition:  Home or Self Care     Discharge Medications:     Discharge Medications        START taking these medications        Instructions Prescription details   acetaminophen 500 MG Tabs  Commonly known as: Tylenol Extra Strength      Take 2 tablets (1,000 mg total) by mouth every 6 (six) hours for 8 days.   Stop taking on: 2025  Quantity: 30 tablet  Refills: 0     gabapentin 300 MG Caps  Commonly known as: Neurontin      Take 1 capsule (300 mg total) by mouth every 8 (eight) hours as needed (For breakthrough moderate pain).   Stop taking on:   2025  Quantity: 20 capsule  Refills: 0     ibuprofen 600 MG Tabs  Commonly known as: Motrin      Take 1 tablet (600 mg total) by mouth every 6 (six) hours for 8 days.   Stop taking on: January 21, 2025  Quantity: 30 tablet  Refills: 0     oxyCODONE 5 MG Tabs      Take 1 tablet (5 mg total) by mouth every 6 (six) hours as needed for Pain.   Stop taking on: January 18, 2025  Quantity: 20 tablet  Refills: 0            CONTINUE taking these medications        Instructions Prescription details   famotidine 20 MG Tabs  Commonly known as: Pepcid      Take 2 tablets (40 mg total) by mouth at bedtime.   Quantity: 60 tablet  Refills: 5     Ferrous Sulfate 325 (65 Fe) MG Tabs      Take 1 tablet (325 mg total) by mouth every other day.   Quantity: 90 tablet  Refills: 1     magnesium oxide 400 MG Tabs  Commonly known as: Mag-Ox      Take 1 tablet (400 mg total) by mouth at bedtime.   Quantity: 30 tablet  Refills: 1     Prenatal 27-0.8 MG Tabs      TAKE 1 TABLET BY MOUTH DAILY   Quantity: 30 tablet  Refills: 0     Vitamin D 50 MCG (2000 UT) Tabs      Take 1 tablet by mouth daily.   Quantity: 90 tablet  Refills: 1            STOP taking these medications      Aspirin 81 MG Caps        diphenhydrAMINE 25 MG Caps  Commonly known as: Benadryl        sulfamethoxazole-trimethoprim -160 MG Tabs per tablet  Commonly known as: Bactrim DS                  Where to Get Your Medications        These medications were sent to [id number] - [facility] [address] [facility] AT [facility] OF [address] [place] [address], [phone], [phone]  [address] [place] [address]      Phone: [phone]   gabapentin 300 MG Caps  oxyCODONE 5 MG Tabs       Information about where to get these medications is not yet available    Ask your nurse or doctor about these medications  acetaminophen 500 MG Tabs  ibuprofen 600 MG Tabs              Plan of Care:   Diet: Regular As Tolerated  Activity:  Pelvic rest, No heavy lifting, No  driving    Follow Up:  Postpartum visit in 2-3 weeks           Lina Fowler MD

## 2025-01-14 ENCOUNTER — PATIENT OUTREACH (OUTPATIENT)
Dept: CASE MANAGEMENT | Age: 35
End: 2025-01-14

## 2025-01-14 NOTE — PROGRESS NOTES
Hospital Follow up for Post partum (Discharge 1/13 elm)     Naresh Lopez ; Follow up 3 weeks   [address]   [address] [address]   [phone]   Delivered: 1/10  Appt made for 2/3@1pm 3w p/p     Confirmed with pt   Closing encounter

## 2025-01-15 NOTE — PAYOR COMM NOTE
--------------  DISCHARGE REVIEW    Payor: [facility]  Subscriber #:  [id number]  Authorization Number: [id number]    Admit date: 1/10/25  Admit time:   4:56 AM  Discharge Date: 2025  1:59 PM     Admitting Physician: Khloe Orr MD  Attending Physician:  No att. providers found  Primary Care Physician: No primary care provider on file.          Discharge Summary Notes        Discharge Summary signed by Lina Fowler MD at 2025  8:44 AM       Author: Lina Fowler MD Specialty: OBSTETRICS & GYNECOLOGY Author Type: Physician    Filed: 2025  8:44 AM Date of Service: 2025  8:43 AM Status: Signed    : Lina Fowler MD (Physician)               DISCHARGE SUMMARY      Date of Admission:  1/10/2025  Date of Discharge: 2025          Admission Diagnoses:  pregnancy  Advanced maternal age in multigravida, third trimester ([facility])  Discharge Diagnoses:  Active Problems:    Advanced maternal age in multigravida, third trimester ([facility])    Fetal heart rate non-reassuring affecting management of mother ([facility])    40 weeks gestation of pregnancy ([facility])    Term birth of  ([facility])             Operations/Procedures this visit:   Section       Hospital Course:    The patient presented to L&D and underwent c/s - see op note for details.  She delivered a viable infant. Postoperatively the patient did very well.  Her hemoglobin stabilized.  By POD # 3 the patient was feeling well, tolerating a regular diet, passing flatus, and ambulating and voiding without difficulty.  Her vitals had remained stable & she was afebrile.  Her incision was clean, dry, & intact without evidence of infection or erythema.  The patient desired discharge on POD # 3.       Condition at Discharge:  /84 (BP Location: Right arm)   Pulse 68   Temp 97.7 °F (36.5 °C) (Oral)   Resp 16   Wt 182 lb (82.6 kg)   LMP 2024 (Exact Date)   SpO2 95%   Breastfeeding No    BMI 27.90 kg/m²     Physical Exam   Gen - NAD  Abdomen -soft, ND, NT  Fundus - firm, NT  Incision - C/D/I  Extremeties - NT    Discharge Disposition:  Home or Self Care     Discharge Medications:     Discharge Medications        START taking these medications        Instructions Prescription details   acetaminophen 500 MG Tabs  Commonly known as: Tylenol Extra Strength      Take 2 tablets (1,000 mg total) by mouth every 6 (six) hours for 8 days.   Stop taking on: January 21, 2025  Quantity: 30 tablet  Refills: 0     gabapentin 300 MG Caps  Commonly known as: Neurontin      Take 1 capsule (300 mg total) by mouth every 8 (eight) hours as needed (For breakthrough moderate pain).   Stop taking on: January 20, 2025  Quantity: 20 capsule  Refills: 0     ibuprofen 600 MG Tabs  Commonly known as: Motrin      Take 1 tablet (600 mg total) by mouth every 6 (six) hours for 8 days.   Stop taking on: January 21, 2025  Quantity: 30 tablet  Refills: 0     oxyCODONE 5 MG Tabs      Take 1 tablet (5 mg total) by mouth every 6 (six) hours as needed for Pain.   Stop taking on: January 18, 2025  Quantity: 20 tablet  Refills: 0            CONTINUE taking these medications        Instructions Prescription details   famotidine 20 MG Tabs  Commonly known as: Pepcid      Take 2 tablets (40 mg total) by mouth at bedtime.   Quantity: 60 tablet  Refills: 5     Ferrous Sulfate 325 (65 Fe) MG Tabs      Take 1 tablet (325 mg total) by mouth every other day.   Quantity: 90 tablet  Refills: 1     magnesium oxide 400 MG Tabs  Commonly known as: Mag-Ox      Take 1 tablet (400 mg total) by mouth at bedtime.   Quantity: 30 tablet  Refills: 1     Prenatal 27-0.8 MG Tabs      TAKE 1 TABLET BY MOUTH DAILY   Quantity: 30 tablet  Refills: 0     Vitamin D 50 MCG (2000 UT) Tabs      Take 1 tablet by mouth daily.   Quantity: 90 tablet  Refills: 1            STOP taking these medications      Aspirin 81 MG Caps        diphenhydrAMINE 25 MG Caps  Commonly known  as: Benadryl        sulfamethoxazole-trimethoprim -160 MG Tabs per tablet  Commonly known as: Bactrim DS                  Where to Get Your Medications        These medications were sent to [facility][id number] - [place], [place] - [address] AT [facility] OF [address], [phone], [phone] [address] [facility] [address]      Phone: [phone]   gabapentin 300 MG Caps  oxyCODONE 5 MG Tabs       Information about where to get these medications is not yet available    Ask your nurse or doctor about these medications  acetaminophen 500 MG Tabs  ibuprofen 600 MG Tabs              Plan of Care:   Diet: Regular As Tolerated  Activity:  Pelvic rest, No heavy lifting, No driving    Follow Up:  Postpartum visit in 2-3 weeks           Lina Fowler MD      Electronically signed by Lina Fowler MD on 1/13/2025  8:44 AM         REVIEWER COMMENTS

## 2025-02-03 ENCOUNTER — POSTPARTUM (OUTPATIENT)
Dept: OBGYN CLINIC | Facility: CLINIC | Age: 35
End: 2025-02-03
Payer: MEDICAID

## 2025-02-03 VITALS
DIASTOLIC BLOOD PRESSURE: 78 MMHG | HEIGHT: 66 IN | BODY MASS INDEX: 25.39 KG/M2 | HEART RATE: 73 BPM | WEIGHT: 158 LBS | SYSTOLIC BLOOD PRESSURE: 116 MMHG

## 2025-02-03 RX ORDER — DROSPIRENONE 4 MG/1
1 TABLET, FILM COATED ORAL DAILY
Qty: 84 TABLET | Refills: 4 | Status: SHIPPED | OUTPATIENT
Start: 2025-02-03 | End: 2026-02-03

## 2025-02-03 NOTE — PROGRESS NOTES
Shirley Blancas is a 35 year old female  Patient's last menstrual period was 2024 (exact date).   Chief Complaint   Patient presents with    Postpartum Care     Pt here for post partum visit pt interested In ocp   Breast feeding. Pt wants contraception.     OBSTETRICS HISTORY:     OB History    Para Term  AB Living   1 1 1     1   SAB IAB Ectopic Multiple Live Births         0 1      # Outcome Date GA Lbr Pierce/2nd Weight Sex Type Anes PTL Lv   1 Term 01/10/25 40w1d 04:11 / 00:15 8 lb (3.63 kg) M Caesarean EPI, Spinal N STEPHAN      Complications: Late decelerations, Variable decelerations, Fetal intolerance to labor       GYNE HISTORY:         Menarche: 11 (2024  9:06 AM)  Period Cycle (Days): 28-30 (2024  9:06 AM)  Period Duration (Days): 5 (2024  9:06 AM)  Period Flow: Light (2024  9:06 AM)  Use of Birth Control (if yes, specify type): OCP (2024  9:06 AM)  Date When Birth Control Last Used: 2023 (2024  9:06 AM)  Hx Prior Abnormal Pap: No (2024  9:06 AM)  Pap Date:  () (2024  9:06 AM)        Latest Ref Rng & Units 2024    10:39 AM   RECENT PAP RESULTS   INTERPRETATION/RESULT: Negative for intraepithelial lesion or malignancy Negative for intraepithelial lesion or malignancy    HPV Negative Negative          MEDICAL HISTORY:     History reviewed. No pertinent past medical history.    SURGICAL HISTORY:     Past Surgical History:   Procedure Laterality Date     delivery only         SOCIAL HISTORY:     Social History     Socioeconomic History    Marital status: Single   Tobacco Use    Smoking status: Never    Smokeless tobacco: Never   Vaping Use    Vaping status: Never Used   Substance and Sexual Activity    Drug use: Never     Social Drivers of Health     Financial Resource Strain: Low Risk  (1/10/2025)    Financial Resource Strain     Difficulty of Paying Living Expenses: Not hard at all     Med Affordability: No    Food Insecurity: No Food Insecurity (1/10/2025)    Food Insecurity     Food Insecurity: Never true   Transportation Needs: No Transportation Needs (1/10/2025)    Transportation Needs     Lack of Transportation: No     Car Seat: Yes   Stress: No Stress Concern Present (1/10/2025)    Stress     Feeling of Stress : No   Housing Stability: Low Risk  (1/10/2025)    Housing Stability     Housing Instability: No     Crib or Bassinette: Yes        FAMILY HISTORY:     Family History   Problem Relation Age of Onset    Other (lupus) Mother     Other (hypothyroidism) Mother     No Known Problems Maternal Grandmother     No Known Problems Maternal Grandfather     No Known Problems Paternal Grandmother     No Known Problems Paternal Grandfather        MEDICATIONS:       Current Outpatient Medications:     Drospirenone (SLYND) 4 MG Oral Tab, Take 1 tablet by mouth daily., Disp: 84 tablet, Rfl: 4    PRENATAL 27-0.8 MG Oral Tab, TAKE 1 TABLET BY MOUTH DAILY, Disp: 30 tablet, Rfl: 0    Ferrous Sulfate 325 (65 Fe) MG Oral Tab, Take 1 tablet (325 mg total) by mouth every other day., Disp: 90 tablet, Rfl: 1    ALLERGIES:     Allergies[1]      REVIEW OF SYSTEMS:     Constitutional:    denies fever / chills  Eyes:     denies blurred or double vision  Cardiovascular:  denies chest pain or palpitations  Respiratory:    denies shortness of breath  Gastrointestinal:  denies severe abdominal pain, frequent diarrhea or constipation, nausea / vomiting  Genitourinary:    denies dysuria, bothersome incontinence  Skin/Breast:   denies any breast pain, lumps, or discharge  Neurological:    denies frequent severe headaches  Psychiatric:   denies depression or anxiety, thoughts of harming self or others  Heme/Lymph:    denies easy bruising or bleeding      PHYSICAL EXAM:   Blood pressure 116/78, pulse 73, height 5' 6\" (1.676 m), weight 158 lb (71.7 kg), last menstrual period 04/04/2024, currently breastfeeding.  Constitutional:  well developed, well  nourished  Head/Face:  normocephalic  Neck/Thyroid: thyroid symmetric, no thyromegaly, no nodules, no adenopathy  Lymphatic: no abnormal supraclavicular or axillary adenopathy is noted  Respiratory:      chest wall symmetric and nontender on palpation, clear to asculation bilateral, no wheezing, rales, ronchi, and resonance normal upon percussion  Cardiovascular: chest normal in appearance, regular rate and rhythm, no murmurs, PMI palpated midclavicular line  Breast:   normal bilaterally without palpable masses, tenderness, asymmetry, nipple discharge, nipple retraction or skin changes bilaterally  Abdomen:   soft, nontender, nondistended, no masses, incision healed.   Skin/Hair:  no unusual rashes or bruises  Extremities:  no edema, no cyanosis, non tender bilaterally  Psychiatric:   oriented to time, place, person and situation. Appropriate mood and affect    Pelvic Exam:  External Genitalia:  normal appearance, hair distribution, and no lesions  Urethral Meatus:   normal in size, location, without lesions   Bladder:    no fullness, masses or tenderness  Vagina:    normal appearance without lesions, no abnormal discharge  Cervix:    No lesions, normal friability   Uterus:    normal in size, 8 wk sized, normal contour, position, mobility, without  motion tenderness  Adnexa:   normal without masses or tenderness  Perineum:   normal  Anus: no hemorroids         ASSESSMENT & PLAN:     [place] was seen today for postpartum care.    Diagnoses and all orders for this visit:    Postpartum exam ([facility]CC)  -     Drospirenone (SLYND) 4 MG Oral Tab; Take 1 tablet by mouth daily.      Specifics of the delivery were discussed with the patient.  The patient states that she is  breast and bottlefeeding.  The patient was screened for postpartum depression and anxiety with a validated instrument.  She denies any current symptoms of depression.  Screening for tobacco use and substance abuse was undertaken.  Follow-up of the prenatal  problem list was addressed.  Infant care and feeding method was discussed with the patient.  Guidance regarding return to her fertility or guidance regarding contraception was discussed with the patient.  The patient opts for ocp .  Resumption of exercise and coitus in 3 weeks discussed with the patient.   Pap smear follow-up was discussed with the patient.   A prescription for oral contraceptive pills was given for 13 months use.  Backup contraception during the first 28 days of use was advised.  Proper use of the pill including attempts at taking the pill at the same time every day discussed with the patient.  Doubling up the day after missed pill day was advised.  If 2 days are missed then backup contraception for another 28 days was advised.  Risks of increased blood clot formation with use of the pill was discussed with the patient.  She denies history of blood clots, liver disease, clotting disorders, breast cancer, or depression.  Benefits of the pill including proposed decrease in ovarian and endometrial cancer were discussed with the patient.  Light menses during use of the pill or amenorrhea during use of the pill are both normal and discussed with the patient.  The patient does not have hypertension or uncontrolled diabetes.    FOLLOW-UP     Return for Annual exam after 7/5/25.      Naresh Crawford MD  2/3/2025       [1] No Known Allergies

## 2025-02-04 ENCOUNTER — TELEPHONE (OUTPATIENT)
Dept: OBGYN UNIT | Facility: HOSPITAL | Age: 35
End: 2025-02-04

## 2025-08-25 ENCOUNTER — OFFICE VISIT (OUTPATIENT)
Dept: OBGYN CLINIC | Facility: CLINIC | Age: 35
End: 2025-08-25

## 2025-08-25 VITALS
SYSTOLIC BLOOD PRESSURE: 114 MMHG | HEIGHT: 66 IN | BODY MASS INDEX: 28.93 KG/M2 | HEART RATE: 75 BPM | DIASTOLIC BLOOD PRESSURE: 79 MMHG | WEIGHT: 180 LBS

## 2025-08-25 DIAGNOSIS — Z01.419 NORMAL GYNECOLOGIC EXAMINATION: Primary | ICD-10-CM

## 2025-08-25 PROCEDURE — 87591 N.GONORRHOEAE DNA AMP PROB: CPT | Performed by: OBSTETRICS & GYNECOLOGY

## 2025-08-25 PROCEDURE — 87491 CHLMYD TRACH DNA AMP PROBE: CPT | Performed by: OBSTETRICS & GYNECOLOGY

## 2025-08-25 RX ORDER — NORETHINDRONE ACETATE AND ETHINYL ESTRADIOL, ETHINYL ESTRADIOL AND FERROUS FUMARATE 1MG-10(24)
1 KIT ORAL DAILY
Qty: 84 TABLET | Refills: 4 | Status: SHIPPED | OUTPATIENT
Start: 2025-08-25 | End: 2026-08-25

## 2025-08-26 LAB
C TRACH DNA SPEC QL NAA+PROBE: NEGATIVE
N GONORRHOEA DNA SPEC QL NAA+PROBE: NEGATIVE

## 2025-08-28 LAB — HPV E6+E7 MRNA CVX QL NAA+PROBE: NEGATIVE

## (undated) DEVICE — 3M™ MEDIPORE™ H SOFT CLOTH SURGICAL TAPE 2864, 4 INCH X 10 YARD (10CM X 9,14M), 12 ROLLS/CASE: Brand: 3M™ MEDIPORE™

## (undated) NOTE — LETTER
[facility]   Administracion de Anestesia  Yo, Shirley Blancas, acepto ser atendido por un anestesiólogo, quien está especialmente capacitado para monitorearme y darme medicamento para hacerme dormir o mantenerme cómodo lisa mi procedimiento.   Entiendo que mi anestesiólogo no es un empleado o agente de [facility] o [facility]. Él o priya trabaja para [facility]  [place] el paciente que solicita los servicios de anestesia, estoy de acuerdo con lo siguiente:  Permitir al anestesiólogo (médico de anestesia) que me suministre el medicamento y hacer los procedimientos adicionales que eric necesarios. Algunos ejemplos son: Al iniciar o utilizar evan “IV” para suministrarme medicamentos, fluidos o james lisa mi procedimiento, y colocarme evan sonda de respiración, me ayudará a respirar cuando esté dormido (intubación). En el norma de que mi corazón deje de funcionar correctamente, entiendo que mi anestesiólogo hará todo lo posible para salvar mi brooke, a menos que los documentos de No resucitar de [facility] lo indiquen de otra manera.  Informar a mi anestesista antes del procedimiento:  Si estoy embarazada.  La última vez que comí o bebí algo.  Todos los medicamentos que melvin (incluyendo medicamentos recetados, suplementos herbales y pastillas que puedo comprar sin receta médica (incluyendo drogas en la rodrigues [medicamentos ilegales]). No informar a mi anestesiólogo acerca de estos medicamentos puede aumentar mi riesgo de complicaciones con la anestesia.  Si soy alérgico a cualquier cosa o he tenido previamente evan reacción adversa a la anestesia.  Entiendo cómo la anestesia me ayudará (beneficios).  Entiendo que con cualquier tipo de anestesia hay riesgos:  Los riesgos más comunes son: náuseas, vómitos, dolor de garganta, dolor muscular, daño a los ojos, la boca o los dientes (por la colocación de la sonda de respiración).  Los riesgos poco  comunes incluyen: recordar lo que sucedió lisa mi procedimiento, reacciones alérgicas a los medicamentos, lesiones en las vías respiratorias, el corazón, los pulmones, la visión, los nervios o músculos, y en casos sumamente raros, la muerte.  Mii médico me ha explicado otras opciones de atención disponibles para mí (alternativas).  Pacientes embarazadas (“epidural”):    Entiendo que los riesgos de recibir evan inyección epidural (medicamento que se aplica en la espalda para ayudar a controlar el dolor lisa el parto), incluyen picazón, presión arterial baja, dificultad para orinar, dolor de crystal o disminución en el ritmo del corazón del bebé. Los riesgos muy poco comunes incluyen infección, hemorragia, convulsiones, ritmo cardíaco irregular y lesión del nervio.  Anestesia regional (“columna vertebral”, “epidural” y “bloqueo de los nervios”):  Entiendo que hay complicaciones poco frecuentes gale posibles, e incluyen dolor de crystal, sangrado, infección, convulsiones, ritmo cardíaco irregular y la lesión del nervio.  .   Puedo cambiar de opinión acerca de recibir servicios de anestesia en cualquier momento antes de que se me administre el medicamento.         Paciente (o representante) Firma/Relación con el paciente  Fecha  Hora  Patient Signature/Signature of Responsible person Date Time           Nombre del intérprete (en jackson norma)  Idioma/Organización  Hora  Name of  Language/Organization Time          Firma del anestesiólogo Fecha  Hora  Signature of anesthesiologist Date Time    He hablado del procedimiento y la información anterior con el paciente (o el representante del paciente) y respondí alcira preguntas. El paciente o jackson representante ha aceptado recibir los servicios de anestesia.         Testigo Fecha  Hora  Witness Date Time  He verificado que la firma es la del paciente o del representante del paciente, y que se firmó antes del procedimiento.    Nombre del paciente: Shirley Sousa  Ruy Blancas Fec. de Nac.: 1/27/1990                 En letra de imprenta: January 10, 2025  Expediente médico No. [id number]                         Página 1 de 2  ----------ANESTHESIA CONSENT----------

## (undated) NOTE — LETTER
VACCINE ADMINISTRATION RECORD  PARENT / GUARDIAN APPROVAL  Date: 10/22/2024  Vaccine administered to: Shirley Blancas     : 1990    MRN: [id number]    A copy of the appropriate Centers for Disease Control and Prevention Vaccine Information statement has been provided. I have read or have had explained the information about the diseases and the vaccines listed below. There was an opportunity to ask questions and any questions were answered satisfactorily. I believe that I understand the benefits and risks of the vaccine cited and ask that the vaccine(s) listed below be given to me or to the person named above (for whom I am authorized to make this request).    VACCINES ADMINISTERED:  Tdap    I have read and hereby agree to be bound by the terms of this agreement as stated above. My signature is valid until revoked by me in writing.  This document is signed by self, relationship: Self on 10/22/2024.:                                                                                                                                         Parent / Guardian Signature                                                Date    AHMET Gregory served as a witness to authentication that the identity of the person signing electronically is in fact the person represented as signing.    This document was generated by AHMET Gregory on 10/22/2024.